# Patient Record
Sex: FEMALE | Race: BLACK OR AFRICAN AMERICAN | NOT HISPANIC OR LATINO | Employment: UNEMPLOYED | URBAN - NONMETROPOLITAN AREA
[De-identification: names, ages, dates, MRNs, and addresses within clinical notes are randomized per-mention and may not be internally consistent; named-entity substitution may affect disease eponyms.]

---

## 2020-01-22 ENCOUNTER — HOSPITAL ENCOUNTER (EMERGENCY)
Facility: HOSPITAL | Age: 55
Discharge: HOME | End: 2020-01-22
Attending: EMERGENCY MEDICINE | Admitting: EMERGENCY MEDICINE

## 2020-01-22 ENCOUNTER — APPOINTMENT (OUTPATIENT)
Dept: MRI IMAGING | Facility: HOSPITAL | Age: 55
End: 2020-01-22

## 2020-01-22 ENCOUNTER — APPOINTMENT (OUTPATIENT)
Dept: CT IMAGING | Facility: HOSPITAL | Age: 55
End: 2020-01-22

## 2020-01-22 ENCOUNTER — APPOINTMENT (OUTPATIENT)
Dept: GENERAL RADIOLOGY | Facility: HOSPITAL | Age: 55
End: 2020-01-22

## 2020-01-22 VITALS
BODY MASS INDEX: 38.65 KG/M2 | TEMPERATURE: 98.1 F | SYSTOLIC BLOOD PRESSURE: 130 MMHG | HEART RATE: 80 BPM | OXYGEN SATURATION: 96 % | HEIGHT: 65 IN | DIASTOLIC BLOOD PRESSURE: 76 MMHG | WEIGHT: 232 LBS | RESPIRATION RATE: 18 BRPM

## 2020-01-22 DIAGNOSIS — G40.909 SEIZURE DISORDER (HCC): ICD-10-CM

## 2020-01-22 DIAGNOSIS — R56.9 NONEPILEPTIC EPISODE (HCC): ICD-10-CM

## 2020-01-22 DIAGNOSIS — R41.82 ALTERED MENTAL STATUS, UNSPECIFIED ALTERED MENTAL STATUS TYPE: Primary | ICD-10-CM

## 2020-01-22 LAB
ABO GROUP BLD: NORMAL
ALBUMIN SERPL-MCNC: 4.4 G/DL (ref 3.5–5.2)
ALBUMIN/GLOB SERPL: 1.1 G/DL
ALP SERPL-CCNC: 102 U/L (ref 39–117)
ALT SERPL W P-5'-P-CCNC: 13 U/L (ref 1–33)
ANION GAP SERPL CALCULATED.3IONS-SCNC: 13 MMOL/L (ref 5–15)
APTT PPP: 25.8 SECONDS (ref 20–40.3)
AST SERPL-CCNC: 16 U/L (ref 1–32)
BASOPHILS # BLD AUTO: 0.06 10*3/MM3 (ref 0–0.2)
BASOPHILS NFR BLD AUTO: 0.6 % (ref 0–1.5)
BILIRUB SERPL-MCNC: 0.3 MG/DL (ref 0.2–1.2)
BLD GP AB SCN SERPL QL: NEGATIVE
BUN BLD-MCNC: 12 MG/DL (ref 6–20)
BUN/CREAT SERPL: 11.2 (ref 7–25)
CALCIUM SPEC-SCNC: 10.1 MG/DL (ref 8.6–10.5)
CHLORIDE SERPL-SCNC: 103 MMOL/L (ref 98–107)
CO2 SERPL-SCNC: 25 MMOL/L (ref 22–29)
CREAT BLD-MCNC: 1.07 MG/DL (ref 0.57–1)
DEPRECATED RDW RBC AUTO: 43.4 FL (ref 37–54)
EOSINOPHIL # BLD AUTO: 0.11 10*3/MM3 (ref 0–0.4)
EOSINOPHIL NFR BLD AUTO: 1.1 % (ref 0.3–6.2)
ERYTHROCYTE [DISTWIDTH] IN BLOOD BY AUTOMATED COUNT: 13.4 % (ref 12.3–15.4)
GFR SERPL CREATININE-BSD FRML MDRD: 53 ML/MIN/1.73
GFR SERPL CREATININE-BSD FRML MDRD: 65 ML/MIN/1.73
GLOBULIN UR ELPH-MCNC: 4.1 GM/DL
GLUCOSE BLD-MCNC: 93 MG/DL (ref 65–99)
HCT VFR BLD AUTO: 44 % (ref 34–46.6)
HGB BLD-MCNC: 14.3 G/DL (ref 12–15.9)
HOLD SPECIMEN: NORMAL
HOLD SPECIMEN: NORMAL
IMM GRANULOCYTES # BLD AUTO: 0.02 10*3/MM3 (ref 0–0.05)
IMM GRANULOCYTES NFR BLD AUTO: 0.2 % (ref 0–0.5)
INR PPP: 0.99 (ref 0.8–1.2)
LYMPHOCYTES # BLD AUTO: 3.88 10*3/MM3 (ref 0.7–3.1)
LYMPHOCYTES NFR BLD AUTO: 38 % (ref 19.6–45.3)
Lab: NORMAL
MCH RBC QN AUTO: 28.7 PG (ref 26.6–33)
MCHC RBC AUTO-ENTMCNC: 32.5 G/DL (ref 31.5–35.7)
MCV RBC AUTO: 88.4 FL (ref 79–97)
MONOCYTES # BLD AUTO: 0.66 10*3/MM3 (ref 0.1–0.9)
MONOCYTES NFR BLD AUTO: 6.5 % (ref 5–12)
NEUTROPHILS # BLD AUTO: 5.48 10*3/MM3 (ref 1.7–7)
NEUTROPHILS NFR BLD AUTO: 53.6 % (ref 42.7–76)
NRBC BLD AUTO-RTO: 0 /100 WBC (ref 0–0.2)
PLATELET # BLD AUTO: 335 10*3/MM3 (ref 140–450)
PMV BLD AUTO: 11.3 FL (ref 6–12)
POTASSIUM BLD-SCNC: 3.8 MMOL/L (ref 3.5–5.2)
PROT SERPL-MCNC: 8.5 G/DL (ref 6–8.5)
PROTHROMBIN TIME: 12.9 SECONDS (ref 11.1–15.3)
RBC # BLD AUTO: 4.98 10*6/MM3 (ref 3.77–5.28)
RH BLD: POSITIVE
SODIUM BLD-SCNC: 141 MMOL/L (ref 136–145)
T&S EXPIRATION DATE: NORMAL
TROPONIN T SERPL-MCNC: <0.01 NG/ML (ref 0–0.03)
WBC NRBC COR # BLD: 10.21 10*3/MM3 (ref 3.4–10.8)
WHOLE BLOOD HOLD SPECIMEN: NORMAL
WHOLE BLOOD HOLD SPECIMEN: NORMAL

## 2020-01-22 PROCEDURE — 86901 BLOOD TYPING SEROLOGIC RH(D): CPT | Performed by: EMERGENCY MEDICINE

## 2020-01-22 PROCEDURE — 70549 MR ANGIOGRAPH NECK W/O&W/DYE: CPT

## 2020-01-22 PROCEDURE — 85610 PROTHROMBIN TIME: CPT | Performed by: EMERGENCY MEDICINE

## 2020-01-22 PROCEDURE — 93010 ELECTROCARDIOGRAM REPORT: CPT | Performed by: INTERNAL MEDICINE

## 2020-01-22 PROCEDURE — 80053 COMPREHEN METABOLIC PANEL: CPT | Performed by: EMERGENCY MEDICINE

## 2020-01-22 PROCEDURE — 85730 THROMBOPLASTIN TIME PARTIAL: CPT | Performed by: EMERGENCY MEDICINE

## 2020-01-22 PROCEDURE — A9576 INJ PROHANCE MULTIPACK: HCPCS | Performed by: EMERGENCY MEDICINE

## 2020-01-22 PROCEDURE — 86900 BLOOD TYPING SEROLOGIC ABO: CPT

## 2020-01-22 PROCEDURE — 85025 COMPLETE CBC W/AUTO DIFF WBC: CPT | Performed by: EMERGENCY MEDICINE

## 2020-01-22 PROCEDURE — 70551 MRI BRAIN STEM W/O DYE: CPT

## 2020-01-22 PROCEDURE — 99285 EMERGENCY DEPT VISIT HI MDM: CPT

## 2020-01-22 PROCEDURE — 99245 OFF/OP CONSLTJ NEW/EST HI 55: CPT | Performed by: PSYCHIATRY & NEUROLOGY

## 2020-01-22 PROCEDURE — 70450 CT HEAD/BRAIN W/O DYE: CPT

## 2020-01-22 PROCEDURE — 86901 BLOOD TYPING SEROLOGIC RH(D): CPT

## 2020-01-22 PROCEDURE — 25010000002 GADOTERIDOL PER 1 ML: Performed by: EMERGENCY MEDICINE

## 2020-01-22 PROCEDURE — 86850 RBC ANTIBODY SCREEN: CPT | Performed by: EMERGENCY MEDICINE

## 2020-01-22 PROCEDURE — 71045 X-RAY EXAM CHEST 1 VIEW: CPT

## 2020-01-22 PROCEDURE — 70544 MR ANGIOGRAPHY HEAD W/O DYE: CPT

## 2020-01-22 PROCEDURE — 84484 ASSAY OF TROPONIN QUANT: CPT | Performed by: EMERGENCY MEDICINE

## 2020-01-22 PROCEDURE — 86900 BLOOD TYPING SEROLOGIC ABO: CPT | Performed by: EMERGENCY MEDICINE

## 2020-01-22 PROCEDURE — 93005 ELECTROCARDIOGRAM TRACING: CPT | Performed by: EMERGENCY MEDICINE

## 2020-01-22 RX ORDER — SODIUM CHLORIDE 0.9 % (FLUSH) 0.9 %
10 SYRINGE (ML) INJECTION AS NEEDED
Status: DISCONTINUED | OUTPATIENT
Start: 2020-01-22 | End: 2020-01-22 | Stop reason: HOSPADM

## 2020-01-22 RX ORDER — LEVETIRACETAM 500 MG/1
500 TABLET ORAL 2 TIMES DAILY
Qty: 60 TABLET | Refills: 0 | Status: SHIPPED | OUTPATIENT
Start: 2020-01-22 | End: 2022-03-08

## 2020-01-22 RX ADMIN — GADOTERIDOL 20 ML: 279.3 INJECTION, SOLUTION INTRAVENOUS at 16:18

## 2021-04-19 ENCOUNTER — IMMUNIZATION (OUTPATIENT)
Dept: VACCINE CLINIC | Facility: HOSPITAL | Age: 56
End: 2021-04-19

## 2021-04-19 PROCEDURE — 91300 HC SARSCOV02 VAC 30MCG/0.3ML IM: CPT | Performed by: THORACIC SURGERY (CARDIOTHORACIC VASCULAR SURGERY)

## 2021-04-19 PROCEDURE — 0001A: CPT | Performed by: THORACIC SURGERY (CARDIOTHORACIC VASCULAR SURGERY)

## 2021-05-10 ENCOUNTER — IMMUNIZATION (OUTPATIENT)
Dept: VACCINE CLINIC | Facility: HOSPITAL | Age: 56
End: 2021-05-10

## 2021-05-10 PROCEDURE — 0002A: CPT | Performed by: THORACIC SURGERY (CARDIOTHORACIC VASCULAR SURGERY)

## 2021-05-10 PROCEDURE — 91300 HC SARSCOV02 VAC 30MCG/0.3ML IM: CPT | Performed by: THORACIC SURGERY (CARDIOTHORACIC VASCULAR SURGERY)

## 2022-03-08 ENCOUNTER — APPOINTMENT (OUTPATIENT)
Dept: GENERAL RADIOLOGY | Facility: HOSPITAL | Age: 57
End: 2022-03-08

## 2022-03-08 ENCOUNTER — LAB (OUTPATIENT)
Dept: LAB | Facility: HOSPITAL | Age: 57
End: 2022-03-08

## 2022-03-08 ENCOUNTER — OFFICE VISIT (OUTPATIENT)
Dept: FAMILY MEDICINE CLINIC | Facility: CLINIC | Age: 57
End: 2022-03-08

## 2022-03-08 ENCOUNTER — HOSPITAL ENCOUNTER (EMERGENCY)
Facility: HOSPITAL | Age: 57
Discharge: HOME | End: 2022-03-08
Attending: STUDENT IN AN ORGANIZED HEALTH CARE EDUCATION/TRAINING PROGRAM

## 2022-03-08 VITALS
BODY MASS INDEX: 37.45 KG/M2 | OXYGEN SATURATION: 99 % | HEIGHT: 65 IN | TEMPERATURE: 97.2 F | WEIGHT: 224.8 LBS | SYSTOLIC BLOOD PRESSURE: 134 MMHG | DIASTOLIC BLOOD PRESSURE: 72 MMHG | HEART RATE: 100 BPM

## 2022-03-08 VITALS
RESPIRATION RATE: 20 BRPM | WEIGHT: 226.6 LBS | OXYGEN SATURATION: 100 % | SYSTOLIC BLOOD PRESSURE: 131 MMHG | HEIGHT: 64 IN | HEART RATE: 72 BPM | TEMPERATURE: 97.8 F | BODY MASS INDEX: 38.68 KG/M2 | DIASTOLIC BLOOD PRESSURE: 83 MMHG

## 2022-03-08 DIAGNOSIS — Z12.11 ENCOUNTER FOR SCREENING COLONOSCOPY: ICD-10-CM

## 2022-03-08 DIAGNOSIS — Z12.31 BREAST CANCER SCREENING BY MAMMOGRAM: ICD-10-CM

## 2022-03-08 DIAGNOSIS — M79.605 PAIN IN BOTH LOWER EXTREMITIES: Primary | ICD-10-CM

## 2022-03-08 DIAGNOSIS — R29.898 WEAKNESS OF BOTH LOWER EXTREMITIES: Primary | ICD-10-CM

## 2022-03-08 DIAGNOSIS — M79.604 PAIN IN BOTH LOWER EXTREMITIES: Primary | ICD-10-CM

## 2022-03-08 DIAGNOSIS — Z11.59 ENCOUNTER FOR HEPATITIS C SCREENING TEST FOR LOW RISK PATIENT: ICD-10-CM

## 2022-03-08 DIAGNOSIS — R42 LIGHTHEADED: ICD-10-CM

## 2022-03-08 DIAGNOSIS — Z00.00 WELLNESS EXAMINATION: ICD-10-CM

## 2022-03-08 DIAGNOSIS — R82.90 ABNORMAL URINE ODOR: ICD-10-CM

## 2022-03-08 LAB
ANION GAP SERPL CALCULATED.3IONS-SCNC: 8 MMOL/L (ref 5–15)
BACTERIA UR QL AUTO: ABNORMAL /HPF
BASOPHILS # BLD AUTO: 0.06 10*3/MM3 (ref 0–0.2)
BASOPHILS NFR BLD AUTO: 0.8 % (ref 0–1.5)
BILIRUB BLD-MCNC: ABNORMAL MG/DL
BILIRUB UR QL STRIP: NEGATIVE
BUN SERPL-MCNC: 13 MG/DL (ref 6–20)
BUN/CREAT SERPL: 13.4 (ref 7–25)
CALCIUM SPEC-SCNC: 9.1 MG/DL (ref 8.6–10.5)
CHLORIDE SERPL-SCNC: 106 MMOL/L (ref 98–107)
CLARITY UR: ABNORMAL
CLARITY, POC: CLEAR
CO2 SERPL-SCNC: 25 MMOL/L (ref 22–29)
COLOR UR: YELLOW
COLOR UR: YELLOW
CREAT SERPL-MCNC: 0.97 MG/DL (ref 0.57–1)
DEPRECATED RDW RBC AUTO: 43.3 FL (ref 37–54)
EGFRCR SERPLBLD CKD-EPI 2021: 68.3 ML/MIN/1.73
EOSINOPHIL # BLD AUTO: 0.11 10*3/MM3 (ref 0–0.4)
EOSINOPHIL NFR BLD AUTO: 1.5 % (ref 0.3–6.2)
ERYTHROCYTE [DISTWIDTH] IN BLOOD BY AUTOMATED COUNT: 13.4 % (ref 12.3–15.4)
GLUCOSE SERPL-MCNC: 101 MG/DL (ref 65–99)
GLUCOSE UR STRIP-MCNC: NEGATIVE MG/DL
GLUCOSE UR STRIP-MCNC: NEGATIVE MG/DL
HCT VFR BLD AUTO: 41 % (ref 34–46.6)
HGB BLD-MCNC: 13.7 G/DL (ref 12–15.9)
HGB UR QL STRIP.AUTO: NEGATIVE
HOLD SPECIMEN: NORMAL
HYALINE CASTS UR QL AUTO: ABNORMAL /LPF
IMM GRANULOCYTES # BLD AUTO: 0.02 10*3/MM3 (ref 0–0.05)
IMM GRANULOCYTES NFR BLD AUTO: 0.3 % (ref 0–0.5)
KETONES UR QL STRIP: NEGATIVE
KETONES UR QL: NEGATIVE
LEUKOCYTE ESTERASE UR QL STRIP.AUTO: ABNORMAL
LYMPHOCYTES # BLD AUTO: 2.16 10*3/MM3 (ref 0.7–3.1)
LYMPHOCYTES NFR BLD AUTO: 29.6 % (ref 19.6–45.3)
MAGNESIUM SERPL-MCNC: 2.3 MG/DL (ref 1.6–2.6)
MCH RBC QN AUTO: 29.5 PG (ref 26.6–33)
MCHC RBC AUTO-ENTMCNC: 33.4 G/DL (ref 31.5–35.7)
MCV RBC AUTO: 88.2 FL (ref 79–97)
MONOCYTES # BLD AUTO: 0.43 10*3/MM3 (ref 0.1–0.9)
MONOCYTES NFR BLD AUTO: 5.9 % (ref 5–12)
NEUTROPHILS NFR BLD AUTO: 4.52 10*3/MM3 (ref 1.7–7)
NEUTROPHILS NFR BLD AUTO: 61.9 % (ref 42.7–76)
NITRITE UR QL STRIP: NEGATIVE
NRBC BLD AUTO-RTO: 0 /100 WBC (ref 0–0.2)
NT-PROBNP SERPL-MCNC: 33.7 PG/ML (ref 0–900)
PH UR STRIP.AUTO: 6.5 [PH] (ref 5–9)
PH UR: 6 [PH] (ref 5–8)
PLATELET # BLD AUTO: 330 10*3/MM3 (ref 140–450)
PMV BLD AUTO: 11.4 FL (ref 6–12)
POTASSIUM SERPL-SCNC: 3.9 MMOL/L (ref 3.5–5.2)
PROT UR QL STRIP: NEGATIVE
PROT UR STRIP-MCNC: ABNORMAL MG/DL
QT INTERVAL: 396 MS
QTC INTERVAL: 424 MS
RBC # BLD AUTO: 4.65 10*6/MM3 (ref 3.77–5.28)
RBC # UR STRIP: ABNORMAL /HPF
RBC # UR STRIP: NEGATIVE /UL
REF LAB TEST METHOD: ABNORMAL
SODIUM SERPL-SCNC: 139 MMOL/L (ref 136–145)
SP GR UR STRIP: 1.02 (ref 1–1.03)
SP GR UR: 1.02 (ref 1–1.03)
SQUAMOUS #/AREA URNS HPF: ABNORMAL /HPF
TROPONIN T SERPL-MCNC: <0.01 NG/ML (ref 0–0.03)
UROBILINOGEN UR QL STRIP: ABNORMAL
WBC # UR STRIP: ABNORMAL /HPF
WBC NRBC COR # BLD: 7.3 10*3/MM3 (ref 3.4–10.8)

## 2022-03-08 PROCEDURE — 85025 COMPLETE CBC W/AUTO DIFF WBC: CPT | Performed by: STUDENT IN AN ORGANIZED HEALTH CARE EDUCATION/TRAINING PROGRAM

## 2022-03-08 PROCEDURE — 81001 URINALYSIS AUTO W/SCOPE: CPT | Performed by: STUDENT IN AN ORGANIZED HEALTH CARE EDUCATION/TRAINING PROGRAM

## 2022-03-08 PROCEDURE — 84443 ASSAY THYROID STIM HORMONE: CPT | Performed by: STUDENT IN AN ORGANIZED HEALTH CARE EDUCATION/TRAINING PROGRAM

## 2022-03-08 PROCEDURE — 93005 ELECTROCARDIOGRAM TRACING: CPT | Performed by: STUDENT IN AN ORGANIZED HEALTH CARE EDUCATION/TRAINING PROGRAM

## 2022-03-08 PROCEDURE — 83880 ASSAY OF NATRIURETIC PEPTIDE: CPT | Performed by: STUDENT IN AN ORGANIZED HEALTH CARE EDUCATION/TRAINING PROGRAM

## 2022-03-08 PROCEDURE — 80050 GENERAL HEALTH PANEL: CPT | Performed by: STUDENT IN AN ORGANIZED HEALTH CARE EDUCATION/TRAINING PROGRAM

## 2022-03-08 PROCEDURE — 82607 VITAMIN B-12: CPT | Performed by: STUDENT IN AN ORGANIZED HEALTH CARE EDUCATION/TRAINING PROGRAM

## 2022-03-08 PROCEDURE — 80053 COMPREHEN METABOLIC PANEL: CPT | Performed by: STUDENT IN AN ORGANIZED HEALTH CARE EDUCATION/TRAINING PROGRAM

## 2022-03-08 PROCEDURE — 71046 X-RAY EXAM CHEST 2 VIEWS: CPT

## 2022-03-08 PROCEDURE — 82746 ASSAY OF FOLIC ACID SERUM: CPT | Performed by: STUDENT IN AN ORGANIZED HEALTH CARE EDUCATION/TRAINING PROGRAM

## 2022-03-08 PROCEDURE — 80061 LIPID PANEL: CPT | Performed by: STUDENT IN AN ORGANIZED HEALTH CARE EDUCATION/TRAINING PROGRAM

## 2022-03-08 PROCEDURE — 93010 ELECTROCARDIOGRAM REPORT: CPT | Performed by: INTERNAL MEDICINE

## 2022-03-08 PROCEDURE — 81002 URINALYSIS NONAUTO W/O SCOPE: CPT | Performed by: STUDENT IN AN ORGANIZED HEALTH CARE EDUCATION/TRAINING PROGRAM

## 2022-03-08 PROCEDURE — 84484 ASSAY OF TROPONIN QUANT: CPT | Performed by: STUDENT IN AN ORGANIZED HEALTH CARE EDUCATION/TRAINING PROGRAM

## 2022-03-08 PROCEDURE — 99203 OFFICE O/P NEW LOW 30 MIN: CPT | Performed by: STUDENT IN AN ORGANIZED HEALTH CARE EDUCATION/TRAINING PROGRAM

## 2022-03-08 PROCEDURE — 83036 HEMOGLOBIN GLYCOSYLATED A1C: CPT | Performed by: STUDENT IN AN ORGANIZED HEALTH CARE EDUCATION/TRAINING PROGRAM

## 2022-03-08 PROCEDURE — 99283 EMERGENCY DEPT VISIT LOW MDM: CPT

## 2022-03-08 PROCEDURE — 83735 ASSAY OF MAGNESIUM: CPT | Performed by: STUDENT IN AN ORGANIZED HEALTH CARE EDUCATION/TRAINING PROGRAM

## 2022-03-08 PROCEDURE — 86803 HEPATITIS C AB TEST: CPT | Performed by: STUDENT IN AN ORGANIZED HEALTH CARE EDUCATION/TRAINING PROGRAM

## 2022-03-08 PROCEDURE — 36415 COLL VENOUS BLD VENIPUNCTURE: CPT | Performed by: STUDENT IN AN ORGANIZED HEALTH CARE EDUCATION/TRAINING PROGRAM

## 2022-03-08 PROCEDURE — 82550 ASSAY OF CK (CPK): CPT | Performed by: STUDENT IN AN ORGANIZED HEALTH CARE EDUCATION/TRAINING PROGRAM

## 2022-03-08 RX ORDER — DULOXETIN HYDROCHLORIDE 60 MG/1
60 CAPSULE, DELAYED RELEASE ORAL DAILY
Qty: 30 CAPSULE | Refills: 1 | Status: SHIPPED | OUTPATIENT
Start: 2022-03-08 | End: 2023-01-23

## 2022-03-08 RX ADMIN — SODIUM CHLORIDE, POTASSIUM CHLORIDE, SODIUM LACTATE AND CALCIUM CHLORIDE 1000 ML: 600; 310; 30; 20 INJECTION, SOLUTION INTRAVENOUS at 10:03

## 2022-03-08 NOTE — ED PROVIDER NOTES
Subjective   56-year-old female comes to the ER with many concerns.  Mainly she is had several episodes of falling recently due to weakness in her legs.  Patient also endorses getting lightheaded at times.  No blacking out.  No focal weakness or numbness.  She reports last time that she fell her right side of her middle back is hurting.  No loss of consciousness. This started 2-3 weeks ago.      History provided by:  Patient   used: No        Review of Systems   Constitutional: Positive for activity change and fatigue. Negative for appetite change, chills and fever.   HENT: Negative for congestion and rhinorrhea.    Respiratory: Negative for cough and shortness of breath.    Cardiovascular: Negative for chest pain and palpitations.   Gastrointestinal: Negative for abdominal pain and nausea.   Genitourinary: Negative for dysuria and flank pain.   Musculoskeletal: Positive for back pain. Negative for neck pain.   Skin: Negative for color change and rash.   Neurological: Positive for light-headedness. Negative for dizziness, weakness, numbness and headaches.   Psychiatric/Behavioral: Negative for agitation. The patient is not nervous/anxious.        Past Medical History:   Diagnosis Date   • Stroke (HCC)        Allergies   Allergen Reactions   • Codeine Unknown - High Severity   • Contrast Dye Unknown - High Severity       Past Surgical History:   Procedure Laterality Date   • ABDOMINAL SURGERY     • APPENDECTOMY     • TUBAL ABDOMINAL LIGATION         History reviewed. No pertinent family history.    Social History     Socioeconomic History   • Marital status:    Tobacco Use   • Smoking status: Never Smoker   • Smokeless tobacco: Never Used   Substance and Sexual Activity   • Alcohol use: Never           Objective    Vitals:    03/08/22 0932   BP: 156/88   BP Location: Right arm   Patient Position: Sitting   Pulse: 87   Resp: 20   Temp: 97.8 °F (36.6 °C)   TempSrc: Infrared   SpO2: 96%  "  Weight: 103 kg (226 lb 9.6 oz)   Height: 162.6 cm (64\")       Physical Exam  Vitals and nursing note reviewed.   Constitutional:       General: She is not in acute distress.     Appearance: She is well-developed. She is obese. She is not ill-appearing, toxic-appearing or diaphoretic.   HENT:      Head: Normocephalic.      Right Ear: External ear normal.      Left Ear: External ear normal.   Eyes:      Pupils: Pupils are equal, round, and reactive to light.   Pulmonary:      Effort: Pulmonary effort is normal. No accessory muscle usage or respiratory distress.      Breath sounds: No wheezing.   Chest:      Chest wall: No tenderness.   Abdominal:      General: Bowel sounds are normal.      Palpations: Abdomen is soft.      Tenderness: There is no abdominal tenderness (deep palpation).   Musculoskeletal:         General: No tenderness, deformity or signs of injury. Normal range of motion.        Back:       Right lower leg: No edema.      Left lower leg: No edema.   Skin:     General: Skin is warm and dry.   Neurological:      Mental Status: She is alert and oriented to person, place, and time.      Sensory: No sensory deficit.      Motor: No weakness.      Coordination: Coordination normal.   Psychiatric:         Behavior: Behavior normal.         ECG 12 Lead      Date/Time: 3/8/2022 11:06 AM  Performed by: Tesfaye Chowdhury MD  Authorized by: Tesfaye Chowdhury MD   Interpreted by physician  Rhythm: sinus rhythm  Rate: normal  QRS axis: normal  ST segment elevation noted on lead: none.                   ED Course      Results for orders placed or performed during the hospital encounter of 03/08/22   Basic Metabolic Panel    Specimen: Blood   Result Value Ref Range    Glucose 101 (H) 65 - 99 mg/dL    BUN 13 6 - 20 mg/dL    Creatinine 0.97 0.57 - 1.00 mg/dL    Sodium 139 136 - 145 mmol/L    Potassium 3.9 3.5 - 5.2 mmol/L    Chloride 106 98 - 107 mmol/L    CO2 25.0 22.0 - 29.0 mmol/L    Calcium 9.1 8.6 - 10.5 mg/dL    " BUN/Creatinine Ratio 13.4 7.0 - 25.0    Anion Gap 8.0 5.0 - 15.0 mmol/L    eGFR 68.3 >60.0 mL/min/1.73   Urinalysis With Microscopic If Indicated (No Culture) - Urine, Clean Catch    Specimen: Urine, Clean Catch   Result Value Ref Range    Color, UA Yellow Yellow, Straw, Dark Yellow, Carli    Appearance, UA Cloudy (A) Clear    pH, UA 6.5 5.0 - 9.0    Specific Gravity, UA 1.020 1.003 - 1.030    Glucose, UA Negative Negative    Ketones, UA Negative Negative    Bilirubin, UA Negative Negative    Blood, UA Negative Negative    Protein, UA Negative Negative    Leuk Esterase, UA Small (1+) (A) Negative    Nitrite, UA Negative Negative    Urobilinogen, UA 1.0 E.U./dL 0.2 - 1.0 E.U./dL   Magnesium    Specimen: Blood   Result Value Ref Range    Magnesium 2.3 1.6 - 2.6 mg/dL   Troponin    Specimen: Blood   Result Value Ref Range    Troponin T <0.010 0.000 - 0.030 ng/mL   BNP    Specimen: Blood   Result Value Ref Range    proBNP 33.7 0.0 - 900.0 pg/mL   CBC Auto Differential    Specimen: Blood   Result Value Ref Range    WBC 7.30 3.40 - 10.80 10*3/mm3    RBC 4.65 3.77 - 5.28 10*6/mm3    Hemoglobin 13.7 12.0 - 15.9 g/dL    Hematocrit 41.0 34.0 - 46.6 %    MCV 88.2 79.0 - 97.0 fL    MCH 29.5 26.6 - 33.0 pg    MCHC 33.4 31.5 - 35.7 g/dL    RDW 13.4 12.3 - 15.4 %    RDW-SD 43.3 37.0 - 54.0 fl    MPV 11.4 6.0 - 12.0 fL    Platelets 330 140 - 450 10*3/mm3    Neutrophil % 61.9 42.7 - 76.0 %    Lymphocyte % 29.6 19.6 - 45.3 %    Monocyte % 5.9 5.0 - 12.0 %    Eosinophil % 1.5 0.3 - 6.2 %    Basophil % 0.8 0.0 - 1.5 %    Immature Grans % 0.3 0.0 - 0.5 %    Neutrophils, Absolute 4.52 1.70 - 7.00 10*3/mm3    Lymphocytes, Absolute 2.16 0.70 - 3.10 10*3/mm3    Monocytes, Absolute 0.43 0.10 - 0.90 10*3/mm3    Eosinophils, Absolute 0.11 0.00 - 0.40 10*3/mm3    Basophils, Absolute 0.06 0.00 - 0.20 10*3/mm3    Immature Grans, Absolute 0.02 0.00 - 0.05 10*3/mm3    nRBC 0.0 0.0 - 0.2 /100 WBC   Urinalysis, Microscopic Only - Urine, Clean Catch     Specimen: Urine, Clean Catch   Result Value Ref Range    RBC, UA 0-2 (A) None Seen /HPF    WBC, UA 6-12 (A) None Seen, 0-2, 3-5 /HPF    Bacteria, UA 2+ (A) None Seen /HPF    Squamous Epithelial Cells, UA 6-12 (A) None Seen, 0-2 /HPF    Hyaline Casts, UA 0-2 None Seen /LPF    Methodology Automated Microscopy      XR Chest 2 View   Final Result   No acute cardiopulmonary disease      Electronically signed by:  Derick Nelson MD  3/8/2022 10:43 AM CST   Workstation: [id number]                                                   MDM  Number of Diagnoses or Management Options  Lightheaded: new and requires workup  Weakness of both lower extremities: new and requires workup  Diagnosis management comments: Vital signs are stable, afebrile.  Neurologically intact.  Labs are unremarkable.  Chest x-ray shows no acute cardiopulmonary process.  No obvious acute osseous pathology after discussion with radiologist.  Patient has an appointment with her PCP later today.  Recommend she keep that appointment.  Return precautions provided.      Final diagnoses:   Weakness of both lower extremities   Lightheaded       ED Disposition  ED Disposition     ED Disposition   Discharge    Condition   Stable    Comment   --             Tess Bermudez MD  [address] [place], [place] [id number] [phone]    Go today  ER follow up         Medication List      No changes were made to your prescriptions during this visit.               Tesfaye Chowdhury MD  03/08/22 0849

## 2022-03-08 NOTE — PROGRESS NOTES
Family Medicine Residency  Tess Bermudez MD    Subjective:     Sloan Boyd is a 56 y.o. female who presents for weakness of her legs bilaterally. She states that she has had a three week history of bilateral leg weakness, numbness, sharp burning pain. She states that she has been recently falling and has fell three times since Friday. She states that she falls suddenly and doesn't know when she is about to fall. She states that she does not lose consciousness during these falls and does not lose control of her bowels. She states that she previously has had these episodes for past 8 months but it comes off an on.Otherwise she states that she has a history of shoulder pain and back pain which is secondary to the falls. She states that she has had a history of a stroke in 2010 and had some weakness on her left side which she mended with doing physical therapy herself. She states that she has not had any follow up care. She states that she is only taking turmeric and Kanna root and doesn't take any other medications.     She states that she regularly used to check her sugar with a machine but has not been diagnosed with diabetes in the past. She states that her vision has decreased over the past year and she needs reading glasses but has not formally had her eyes evaluated. She is concerned that she should not be able to drive or work due to the falls that occur unexpectedly.     The following portions of the patient's history were reviewed and updated as appropriate: allergies, current medications, past family history, past medical history, past social history, past surgical history and problem list.    Past Medical Hx:  Past Medical History:   Diagnosis Date   • Stroke (HCC)        Past Surgical Hx:  Past Surgical History:   Procedure Laterality Date   • ABDOMINAL SURGERY     • APPENDECTOMY     • TUBAL ABDOMINAL LIGATION         Current Meds:    Current Outpatient Medications:   •  DULoxetine (Cymbalta) 60 MG  "capsule, Take 1 capsule by mouth Daily., Disp: 30 capsule, Rfl: 1  No current facility-administered medications for this visit.    Allergies:  Allergies   Allergen Reactions   • Codeine Unknown - High Severity   • Contrast Dye Unknown - High Severity       Family Hx:  History reviewed. No pertinent family history.     Social History:  Social History     Socioeconomic History   • Marital status:    Tobacco Use   • Smoking status: Never Smoker   • Smokeless tobacco: Never Used   Vaping Use   • Vaping Use: Never used   Substance and Sexual Activity   • Alcohol use: Never       Review of Systems  Review of Systems   Constitutional: Negative for fatigue.   Respiratory: Negative for shortness of breath.    Cardiovascular: Negative for chest pain and leg swelling.   Gastrointestinal: Negative for abdominal pain, constipation, diarrhea and nausea.   Endocrine: Negative for cold intolerance.   Genitourinary: Negative for menstrual problem.   Skin: Negative for rash.   Neurological: Negative for weakness, light-headedness and numbness.       Objective:     /72   Pulse 100   Temp 97.2 °F (36.2 °C)   Ht 165.1 cm (65\")   Wt 102 kg (224 lb 12.8 oz)   SpO2 99%   BMI 37.41 kg/m²   Physical Exam  Vitals reviewed.   Constitutional:       Appearance: Normal appearance.   HENT:      Head: Normocephalic and atraumatic.      Right Ear: Tympanic membrane normal.      Left Ear: Tympanic membrane normal.      Nose: Nose normal.      Mouth/Throat:      Mouth: Mucous membranes are moist.   Eyes:      Pupils: Pupils are equal, round, and reactive to light.   Cardiovascular:      Rate and Rhythm: Normal rate.      Pulses: Normal pulses.      Heart sounds: Normal heart sounds.   Pulmonary:      Effort: Pulmonary effort is normal.      Breath sounds: Normal breath sounds.   Abdominal:      General: Abdomen is flat. Bowel sounds are normal.      Palpations: Abdomen is soft.   Musculoskeletal:         General: Normal range of " motion.      Cervical back: Normal range of motion and neck supple.   Skin:     General: Skin is warm and dry.      Capillary Refill: Capillary refill takes less than 2 seconds.   Neurological:      General: No focal deficit present.      Mental Status: She is alert and oriented to person, place, and time. Mental status is at baseline.   Psychiatric:         Mood and Affect: Mood normal.          Assessment/Plan:     Diagnoses and all orders for this visit:    1. Wellness examination (Primary)  -     Lipid Panel  -     Hemoglobin A1c    2. Pain in both lower extremities  -  Will rule out stroke/MS with MRI BRAIN without contrast.   - Cymbalta for Neuropathic pain of bl legs.   - Will rule out electrolyte and vitamin B12/folate deficiency.           -     CMP  -     TSH  -     CK  -     Vitamin B12  -     Folate  -     DULoxetine (Cymbalta) 60 MG capsule; Take 1 capsule by mouth Daily.  Dispense: 30 capsule; Refill: 1  -     MRI Brain Without Contrast    3. Encounter for screening colonoscopy  -     Ambulatory Referral For Screening Colonoscopy    4. Breast cancer screening by mammogram  -     Mammo screening digital tomosynthesis bilateral w CAD    5. Encounter for hepatitis C screening test for low risk patient  -     Hepatitis C antibody        · Rx changes: As above  · Patient Education: n/a  · Compliance at present is estimated to be fair.   · Efforts to improve compliance (if necessary) will be directed at increased exercise.    Depression screening: Depression screening performed today; result negative; no follow up needed     Follow-up:     Return in about 2 weeks (around 3/22/2022).    Preventative:  Health Maintenance   Topic Date Due   • MAMMOGRAM  Never done   • COLORECTAL CANCER SCREENING  Never done   • ANNUAL PHYSICAL  Never done   • TDAP/TD VACCINES (1 - Tdap) Never done   • ZOSTER VACCINE (1 of 2) Never done   • INFLUENZA VACCINE  Never done   • COVID-19 Vaccine (3 - Booster for Pfizer series)  10/10/2021   • HEPATITIS C SCREENING  Never done   • PAP SMEAR  Never done   • Pneumococcal Vaccine 0-64  Aged Out     Female Preventative: Exercises regularly  Recommended: none  Vaccine Counseling: N/A    Weight  -Class: Obese Class II: 35-39.9kg/m2  -Patient's Body mass index is 37.41 kg/m². indicating that she is morbidly obese (BMI > 40 or > 35 with obesity - related health condition). Obesity-related health conditions include the following: none. Obesity is newly identified. BMI is is above average; BMI management plan is completed. We discussed portion control and increasing exercise..   eat more fruits and vegetables    Alcohol use:  reports no history of alcohol use.  Nicotine status  reports that she has never smoked. She has never used smokeless tobacco.     Goals    None         RISK SCORE: 3        This document has been electronically signed by Tess Bermudez MD on March 8, 2022 15:07 CST

## 2022-03-08 NOTE — PROGRESS NOTES
I have seen the patient.  I have reviewed the notes, assessments, and/or procedures performed by Tess Bermudez MD, I concur with her/his documentation and assessment and plan for Sloan Boyd.               This document has been electronically signed by Freedom Short MD on March 8, 2022 16:51 CST

## 2022-03-08 NOTE — ED NOTES
Pt alert and oriented with multiple added complaints. Dizziness with feeling lightheaded and several falls for the past several weeks and for years she has been having muscle spasms. She also reports she had a bladder infection for which she took a natural abx for. No fever. She is alert and oriented and moving all her extremities. She denies any shortness of air or chest pain.      Mini Huddleston, CHAZ  03/08/22 0940

## 2022-03-09 LAB
ALBUMIN SERPL-MCNC: 4.3 G/DL (ref 3.5–5.2)
ALBUMIN/GLOB SERPL: 1.2 G/DL
ALP SERPL-CCNC: 93 U/L (ref 39–117)
ALT SERPL W P-5'-P-CCNC: 12 U/L (ref 1–33)
ANION GAP SERPL CALCULATED.3IONS-SCNC: 10 MMOL/L (ref 5–15)
AST SERPL-CCNC: 17 U/L (ref 1–32)
BILIRUB SERPL-MCNC: 0.3 MG/DL (ref 0–1.2)
BUN SERPL-MCNC: 13 MG/DL (ref 6–20)
BUN/CREAT SERPL: 16 (ref 7–25)
CALCIUM SPEC-SCNC: 9.5 MG/DL (ref 8.6–10.5)
CHLORIDE SERPL-SCNC: 105 MMOL/L (ref 98–107)
CHOLEST SERPL-MCNC: 236 MG/DL (ref 0–200)
CK SERPL-CCNC: 73 U/L (ref 20–180)
CO2 SERPL-SCNC: 28 MMOL/L (ref 22–29)
CREAT SERPL-MCNC: 0.81 MG/DL (ref 0.57–1)
EGFRCR SERPLBLD CKD-EPI 2021: 84.8 ML/MIN/1.73
FOLATE SERPL-MCNC: 11 NG/ML (ref 4.78–24.2)
GLOBULIN UR ELPH-MCNC: 3.6 GM/DL
GLUCOSE SERPL-MCNC: 79 MG/DL (ref 65–99)
HBA1C MFR BLD: 5.4 % (ref 4.8–5.6)
HCV AB SER DONR QL: NORMAL
HDLC SERPL-MCNC: 53 MG/DL (ref 40–60)
LDLC SERPL CALC-MCNC: 160 MG/DL (ref 0–100)
LDLC/HDLC SERPL: 2.98 {RATIO}
POTASSIUM SERPL-SCNC: 4.4 MMOL/L (ref 3.5–5.2)
PROT SERPL-MCNC: 7.9 G/DL (ref 6–8.5)
SODIUM SERPL-SCNC: 143 MMOL/L (ref 136–145)
TRIGL SERPL-MCNC: 126 MG/DL (ref 0–150)
TSH SERPL DL<=0.05 MIU/L-ACNC: 1.74 UIU/ML (ref 0.27–4.2)
VIT B12 BLD-MCNC: 776 PG/ML (ref 211–946)
VLDLC SERPL-MCNC: 23 MG/DL (ref 5–40)

## 2022-03-10 RX ORDER — ATORVASTATIN CALCIUM 40 MG/1
40 TABLET, FILM COATED ORAL DAILY
Qty: 30 TABLET | Refills: 3 | Status: SHIPPED | OUTPATIENT
Start: 2022-03-10 | End: 2022-05-18

## 2022-03-11 ENCOUNTER — TELEPHONE (OUTPATIENT)
Dept: FAMILY MEDICINE CLINIC | Facility: CLINIC | Age: 57
End: 2022-03-11

## 2022-03-14 ENCOUNTER — TELEPHONE (OUTPATIENT)
Dept: FAMILY MEDICINE CLINIC | Facility: CLINIC | Age: 57
End: 2022-03-14

## 2022-03-14 NOTE — TELEPHONE ENCOUNTER
attempted to call pt in reference to referral appt date & time. got recording mailbox is full. unable to leave message

## 2022-03-22 ENCOUNTER — OFFICE VISIT (OUTPATIENT)
Dept: FAMILY MEDICINE CLINIC | Facility: CLINIC | Age: 57
End: 2022-03-22

## 2022-03-22 VITALS
WEIGHT: 226.2 LBS | TEMPERATURE: 96.3 F | HEIGHT: 65 IN | HEART RATE: 84 BPM | DIASTOLIC BLOOD PRESSURE: 70 MMHG | SYSTOLIC BLOOD PRESSURE: 120 MMHG | OXYGEN SATURATION: 99 % | BODY MASS INDEX: 37.69 KG/M2

## 2022-03-22 DIAGNOSIS — M79.605 PAIN IN BOTH LOWER EXTREMITIES: ICD-10-CM

## 2022-03-22 DIAGNOSIS — E78.00 HYPERCHOLESTEREMIA: Primary | ICD-10-CM

## 2022-03-22 DIAGNOSIS — M79.604 PAIN IN BOTH LOWER EXTREMITIES: ICD-10-CM

## 2022-03-22 DIAGNOSIS — F41.9 ANXIETY: ICD-10-CM

## 2022-03-22 PROCEDURE — 99213 OFFICE O/P EST LOW 20 MIN: CPT | Performed by: STUDENT IN AN ORGANIZED HEALTH CARE EDUCATION/TRAINING PROGRAM

## 2022-03-22 NOTE — PROGRESS NOTES
Family Medicine Residency  Tess Bermudez MD    Subjective:     Sloan Boyd is a 56 y.o. female who presents for follow-up for pain in bilateral lower extremities.  She states that she took Cymbalta for about 1 week and her pain has completely resolved.  She states that she is now currently pain-free and is able to go back to work.  She states that since she stopped taking Cymbalta for the past week she still does not have any pain.  She states that she does not have any further lower extremity weakness and is not having any further fall episodes.  He states that she is working with her family member who is his physical therapist to regain her stamina and walk 3 miles that she she used to previously.  She states that she has picked up her Lipitor however she would not like to take it at this time because she would like to try lifestyle modifications first.     The following portions of the patient's history were reviewed and updated as appropriate: allergies, current medications, past family history, past medical history, past social history, past surgical history and problem list.    Past Medical Hx:  Past Medical History:   Diagnosis Date   • Stroke (HCC)        Past Surgical Hx:  Past Surgical History:   Procedure Laterality Date   • ABDOMINAL SURGERY     • APPENDECTOMY     • TUBAL ABDOMINAL LIGATION         Current Meds:    Current Outpatient Medications:   •  atorvastatin (Lipitor) 40 MG tablet, Take 1 tablet by mouth Daily., Disp: 30 tablet, Rfl: 3  •  DULoxetine (Cymbalta) 60 MG capsule, Take 1 capsule by mouth Daily., Disp: 30 capsule, Rfl: 1    Allergies:  Allergies   Allergen Reactions   • Codeine Unknown - High Severity   • Contrast Dye Unknown - High Severity       Family Hx:  History reviewed. No pertinent family history.     Social History:  Social History     Socioeconomic History   • Marital status:    Tobacco Use   • Smoking status: Never Smoker   • Smokeless tobacco: Never Used   Vaping  "Use   • Vaping Use: Never used   Substance and Sexual Activity   • Alcohol use: Never       Review of Systems  Review of Systems   Constitutional: Negative for fatigue.   Respiratory: Negative for shortness of breath.    Cardiovascular: Negative for chest pain and leg swelling.   Gastrointestinal: Negative for abdominal pain, constipation, diarrhea and nausea.   Endocrine: Negative for cold intolerance.   Genitourinary: Negative for menstrual problem.   Skin: Negative for rash.   Neurological: Negative for weakness, light-headedness and numbness.       Objective:     /70   Pulse 84   Temp 96.3 °F (35.7 °C)   Ht 165.1 cm (65\")   Wt 103 kg (226 lb 3.2 oz)   SpO2 99%   BMI 37.64 kg/m²   Physical Exam  Vitals reviewed.   Constitutional:       Appearance: Normal appearance. She is obese.   HENT:      Head: Normocephalic and atraumatic.      Right Ear: Tympanic membrane normal.      Left Ear: Tympanic membrane normal.      Nose: Nose normal.      Mouth/Throat:      Mouth: Mucous membranes are moist.   Eyes:      Pupils: Pupils are equal, round, and reactive to light.   Cardiovascular:      Rate and Rhythm: Normal rate.      Pulses: Normal pulses.      Heart sounds: Normal heart sounds.   Pulmonary:      Effort: Pulmonary effort is normal.      Breath sounds: Normal breath sounds.   Abdominal:      General: Abdomen is flat. Bowel sounds are normal.      Palpations: Abdomen is soft.   Musculoskeletal:         General: Normal range of motion.      Cervical back: Normal range of motion and neck supple.   Skin:     General: Skin is warm and dry.      Capillary Refill: Capillary refill takes less than 2 seconds.   Neurological:      General: No focal deficit present.      Mental Status: She is alert and oriented to person, place, and time. Mental status is at baseline.   Psychiatric:         Mood and Affect: Mood normal.          Assessment/Plan:     Diagnoses and all orders for this visit:    1. Hypercholesteremia " (Primary)  -We have prescribed the patient atorvastatin.  We have explained to the patient that not taking atorvastatin may increase her risk of heart attack and stroke given her previous history and current cholesterol levels.  The patient would like to try lifestyle modifications at this time.  We have advised the patient to get lab work done 1 week before her next visit in 2 months while fasting.  If at the next visit she is unable to improve her cholesterol we will ask her to restart taking the atorvastatin.  -     Lipid panel; Future    2. Pain in both lower extremities  -Patient's pain in bilateral extremities have completely resolved.  We have advised the patient that if she continues to have pain to restart taking the Cymbalta.  We have also advised the patient that if she is having any further pain, weakness or falls to contact our office or go to the emergency department.      3. Anxiety.   The patient states that she is no longer having any further anxiety.  She states that most of her anxiety was due to her pain at the last visit and that has now completely resolved.  · Rx changes: n/a  · Patient Education: n/a  · Compliance at present is estimated to be good.   · Efforts to improve compliance (if necessary) will be directed at increased exercise.    Depression screening: Up to date; last screen      Follow-up:     Return in about 2 months (around 5/22/2022).    Preventative:  Health Maintenance   Topic Date Due   • MAMMOGRAM  Never done   • COLORECTAL CANCER SCREENING  Never done   • ANNUAL PHYSICAL  Never done   • TDAP/TD VACCINES (1 - Tdap) Never done   • ZOSTER VACCINE (1 of 2) Never done   • INFLUENZA VACCINE  Never done   • COVID-19 Vaccine (3 - Booster for Pfizer series) 10/10/2021   • PAP SMEAR  Never done   • LIPID PANEL  03/08/2023   • HEPATITIS C SCREENING  Completed   • Pneumococcal Vaccine 0-64  Aged Out     Female Preventative: Exercises regularly  Recommended: none  Vaccine Counseling:  N/A    Weight  -Class: Obese Class II: 35-39.9kg/m2  -Patient's Body mass index is 37.64 kg/m². indicating that she is morbidly obese (BMI > 40 or > 35 with obesity - related health condition). Obesity-related health conditions include the following: none. Obesity is improving with lifestyle modifications. BMI is is above average; BMI management plan is completed. We discussed portion control and increasing exercise..   eat more fruits and vegetables    Alcohol use:  reports no history of alcohol use.  Nicotine status  reports that she has never smoked. She has never used smokeless tobacco.     Goals    None         RISK SCORE: 3        This document has been electronically signed by Tess Bermudez MD on March 22, 2022 09:23 CDT

## 2022-03-22 NOTE — PROGRESS NOTES
I have spoken with the patient .     I have reviewed the notes, assessments, and/or procedures performed by Dr. Tess Bermudez,   I concur with   her  documentation and assessment and plan for Sloan Boyd.          This document has been electronically signed by Mark Calvillo MD on March 22, 2022 09:59 CDT

## 2022-03-23 ENCOUNTER — TELEPHONE (OUTPATIENT)
Dept: FAMILY MEDICINE CLINIC | Facility: CLINIC | Age: 57
End: 2022-03-23

## 2022-03-23 NOTE — TELEPHONE ENCOUNTER
Attempted to call patient to let her know I cancelled her MRI appointment on 4/4. The voicemail was full but will try again later on today.

## 2022-04-04 ENCOUNTER — APPOINTMENT (OUTPATIENT)
Dept: MRI IMAGING | Facility: HOSPITAL | Age: 57
End: 2022-04-04

## 2022-05-10 ENCOUNTER — LAB (OUTPATIENT)
Dept: LAB | Facility: HOSPITAL | Age: 57
End: 2022-05-10

## 2022-05-10 DIAGNOSIS — E78.00 HYPERCHOLESTEREMIA: ICD-10-CM

## 2022-05-10 LAB
CHOLEST SERPL-MCNC: 215 MG/DL (ref 0–200)
HDLC SERPL-MCNC: 48 MG/DL (ref 40–60)
LDLC SERPL CALC-MCNC: 150 MG/DL (ref 0–100)
LDLC/HDLC SERPL: 3.08 {RATIO}
TRIGL SERPL-MCNC: 95 MG/DL (ref 0–150)
VLDLC SERPL-MCNC: 17 MG/DL (ref 5–40)

## 2022-05-10 PROCEDURE — 36415 COLL VENOUS BLD VENIPUNCTURE: CPT

## 2022-05-10 PROCEDURE — 80061 LIPID PANEL: CPT

## 2022-05-11 NOTE — PROGRESS NOTES
The labs results have been reviewed. The patient has been called and informed of the lab results. All questions and concerns have been answered during this phone call.

## 2022-05-18 ENCOUNTER — LAB (OUTPATIENT)
Dept: LAB | Facility: HOSPITAL | Age: 57
End: 2022-05-18

## 2022-05-18 ENCOUNTER — OFFICE VISIT (OUTPATIENT)
Dept: FAMILY MEDICINE CLINIC | Facility: CLINIC | Age: 57
End: 2022-05-18

## 2022-05-18 VITALS
BODY MASS INDEX: 37.85 KG/M2 | DIASTOLIC BLOOD PRESSURE: 76 MMHG | HEART RATE: 90 BPM | OXYGEN SATURATION: 97 % | WEIGHT: 227.2 LBS | HEIGHT: 65 IN | SYSTOLIC BLOOD PRESSURE: 126 MMHG | TEMPERATURE: 90.6 F

## 2022-05-18 DIAGNOSIS — R41.3 MEMORY LOSS: Primary | ICD-10-CM

## 2022-05-18 LAB
BASOPHILS # BLD AUTO: 0.06 10*3/MM3 (ref 0–0.2)
BASOPHILS NFR BLD AUTO: 0.9 % (ref 0–1.5)
DEPRECATED RDW RBC AUTO: 39.8 FL (ref 37–54)
EOSINOPHIL # BLD AUTO: 0.15 10*3/MM3 (ref 0–0.4)
EOSINOPHIL NFR BLD AUTO: 2.2 % (ref 0.3–6.2)
ERYTHROCYTE [DISTWIDTH] IN BLOOD BY AUTOMATED COUNT: 12.8 % (ref 12.3–15.4)
HCT VFR BLD AUTO: 40 % (ref 34–46.6)
HGB BLD-MCNC: 13.3 G/DL (ref 12–15.9)
IMM GRANULOCYTES # BLD AUTO: 0.02 10*3/MM3 (ref 0–0.05)
IMM GRANULOCYTES NFR BLD AUTO: 0.3 % (ref 0–0.5)
LYMPHOCYTES # BLD AUTO: 2.55 10*3/MM3 (ref 0.7–3.1)
LYMPHOCYTES NFR BLD AUTO: 37.7 % (ref 19.6–45.3)
MCH RBC QN AUTO: 29.2 PG (ref 26.6–33)
MCHC RBC AUTO-ENTMCNC: 33.3 G/DL (ref 31.5–35.7)
MCV RBC AUTO: 87.7 FL (ref 79–97)
MONOCYTES # BLD AUTO: 0.42 10*3/MM3 (ref 0.1–0.9)
MONOCYTES NFR BLD AUTO: 6.2 % (ref 5–12)
NEUTROPHILS NFR BLD AUTO: 3.57 10*3/MM3 (ref 1.7–7)
NEUTROPHILS NFR BLD AUTO: 52.7 % (ref 42.7–76)
NRBC BLD AUTO-RTO: 0 /100 WBC (ref 0–0.2)
PLATELET # BLD AUTO: 303 10*3/MM3 (ref 140–450)
PMV BLD AUTO: 12.7 FL (ref 6–12)
RBC # BLD AUTO: 4.56 10*6/MM3 (ref 3.77–5.28)
TSH SERPL DL<=0.05 MIU/L-ACNC: 1.24 UIU/ML (ref 0.27–4.2)
VIT B12 BLD-MCNC: 706 PG/ML (ref 211–946)
WBC NRBC COR # BLD: 6.77 10*3/MM3 (ref 3.4–10.8)

## 2022-05-18 PROCEDURE — 85025 COMPLETE CBC W/AUTO DIFF WBC: CPT | Performed by: STUDENT IN AN ORGANIZED HEALTH CARE EDUCATION/TRAINING PROGRAM

## 2022-05-18 PROCEDURE — 82607 VITAMIN B-12: CPT | Performed by: STUDENT IN AN ORGANIZED HEALTH CARE EDUCATION/TRAINING PROGRAM

## 2022-05-18 PROCEDURE — 36415 COLL VENOUS BLD VENIPUNCTURE: CPT | Performed by: STUDENT IN AN ORGANIZED HEALTH CARE EDUCATION/TRAINING PROGRAM

## 2022-05-18 PROCEDURE — 99213 OFFICE O/P EST LOW 20 MIN: CPT | Performed by: STUDENT IN AN ORGANIZED HEALTH CARE EDUCATION/TRAINING PROGRAM

## 2022-05-18 PROCEDURE — 84443 ASSAY THYROID STIM HORMONE: CPT | Performed by: STUDENT IN AN ORGANIZED HEALTH CARE EDUCATION/TRAINING PROGRAM

## 2022-05-18 RX ORDER — ATORVASTATIN CALCIUM 40 MG/1
40 TABLET, FILM COATED ORAL DAILY
Qty: 90 TABLET | Refills: 0 | Status: SHIPPED | OUTPATIENT
Start: 2022-05-18 | End: 2023-01-23

## 2022-05-18 NOTE — PROGRESS NOTES
Family Medicine Residency  Tess Bermudez MD    Subjective:     Sloan Boyd is a 56 y.o. female who presents for management of her hypercholesterolemia.  As she tried to do some lifestyle modifications which has improved her cholesterol but she is we discussed that she still needs to take the medication given her previous history of stroke.  Patient patient states that she is has had memory loss recently she states that been going over the past 6 months and that she has had to have quit her job because she was unable to remember how to use the restroom.  She states that she has been she has been forgetful as at home as well but is not as noticeable because she has a routine at home.  Also notes that she sleeps by 5 hours a night.     The following portions of the patient's history were reviewed and updated as appropriate: allergies, current medications, past family history, past medical history, past social history, past surgical history and problem list.    Past Medical Hx:  Past Medical History:   Diagnosis Date   • Stroke (HCC)        Past Surgical Hx:  Past Surgical History:   Procedure Laterality Date   • ABDOMINAL SURGERY     • APPENDECTOMY     • TUBAL ABDOMINAL LIGATION         Current Meds:    Current Outpatient Medications:   •  atorvastatin (Lipitor) 40 MG tablet, Take 1 tablet by mouth Daily., Disp: 30 tablet, Rfl: 3  •  DULoxetine (Cymbalta) 60 MG capsule, Take 1 capsule by mouth Daily., Disp: 30 capsule, Rfl: 1    Allergies:  Allergies   Allergen Reactions   • Codeine Unknown - High Severity   • Contrast Dye Unknown - High Severity       Family Hx:  History reviewed. No pertinent family history.     Social History:  Social History     Socioeconomic History   • Marital status:    Tobacco Use   • Smoking status: Never Smoker   • Smokeless tobacco: Never Used   Vaping Use   • Vaping Use: Never used   Substance and Sexual Activity   • Alcohol use: Never       Review of Systems  Review of Systems  "  Constitutional: Negative for fatigue.   Respiratory: Negative for shortness of breath.    Cardiovascular: Negative for chest pain and leg swelling.   Gastrointestinal: Negative for abdominal pain, constipation, diarrhea and nausea.   Endocrine: Negative for cold intolerance.   Genitourinary: Negative for menstrual problem.   Skin: Negative for rash.   Neurological: Negative for weakness, light-headedness and numbness.       Objective:     /76   Pulse 90   Temp (!) 90.6 °F (32.6 °C)   Ht 165.1 cm (65\")   Wt 103 kg (227 lb 3.2 oz)   SpO2 97%   BMI 37.81 kg/m²   Physical Exam  Vitals reviewed.   Constitutional:       Appearance: Normal appearance.   HENT:      Head: Normocephalic and atraumatic.      Right Ear: Tympanic membrane normal.      Left Ear: Tympanic membrane normal.      Nose: Nose normal.      Mouth/Throat:      Mouth: Mucous membranes are moist.   Eyes:      Pupils: Pupils are equal, round, and reactive to light.   Cardiovascular:      Rate and Rhythm: Normal rate.      Pulses: Normal pulses.      Heart sounds: Normal heart sounds.   Pulmonary:      Effort: Pulmonary effort is normal.      Breath sounds: Normal breath sounds.   Abdominal:      General: Abdomen is flat. Bowel sounds are normal.      Palpations: Abdomen is soft.   Musculoskeletal:         General: Normal range of motion.      Cervical back: Normal range of motion and neck supple.   Skin:     General: Skin is warm and dry.      Capillary Refill: Capillary refill takes less than 2 seconds.   Neurological:      General: No focal deficit present.      Mental Status: She is alert and oriented to person, place, and time. Mental status is at baseline.   Psychiatric:         Mood and Affect: Mood normal.          Assessment/Plan:     Diagnoses and all orders for this visit:    1. Memory loss (Primary)  -     Vitamin B12  -     TSH  -     CBC w AUTO Differential  -We will do some these labs as above today. At the next visit we will do a " [place] exam and consider head CT imaging and/or referral to neurology.   -Have also advised the patient to increase hours of sleep for next visit as it may impact her memory.     2.  Hypercholesterolemia  -We will start the patient on Lipitor.    · Rx changes: As above  · Patient Education:   · Compliance at present is estimated to be good.   · Efforts to improve compliance (if necessary) will be directed at increased exercise.    Depression screening: Depression screening performed today; result negative; no follow up needed     Follow-up:     Return in about 4 weeks (around 6/15/2022).    Preventative:  Health Maintenance   Topic Date Due   • MAMMOGRAM  Never done   • COLORECTAL CANCER SCREENING  Never done   • ANNUAL PHYSICAL  Never done   • TDAP/TD VACCINES (1 - Tdap) Never done   • ZOSTER VACCINE (1 of 2) Never done   • COVID-19 Vaccine (3 - Booster for Pfizer series) 10/10/2021   • PAP SMEAR  Never done   • INFLUENZA VACCINE  08/01/2022   • LIPID PANEL  05/10/2023   • HEPATITIS C SCREENING  Completed   • Pneumococcal Vaccine 0-64  Aged Out     Female Preventative: Exercises regularly  Recommended: none  Vaccine Counseling: N/A    Weight  -Class: Obese Class II: 35-39.9kg/m2  -Class 2 Severe Obesity (BMI >=35 and <=39.9). Obesity-related health conditions include the following: dyslipidemias. Obesity is newly identified. BMI is is above average; BMI management plan is completed. We discussed portion control and increasing exercise.   eat more fruits and vegetables    Alcohol use:  reports no history of alcohol use.  Nicotine status  reports that she has never smoked. She has never used smokeless tobacco.     Goals    None         RISK SCORE: 3        This document has been electronically signed by Tess Bermudez MD on May 18, 2022 11:17 CDT

## 2022-06-10 ENCOUNTER — TELEPHONE (OUTPATIENT)
Dept: FAMILY MEDICINE CLINIC | Facility: CLINIC | Age: 57
End: 2022-06-10

## 2022-06-10 NOTE — TELEPHONE ENCOUNTER
Attempted to call the patient back and the phone number given goes directly to voicemail. Will attempt again

## 2022-06-10 NOTE — TELEPHONE ENCOUNTER
PT WANTED TO LET DR. RASHID THAT SHE IS ON HER 3RD WEEK OF WORK AND DOING WELL.    ANY QUESTIONS HER CALL BACK NUMBER [phone]

## 2023-01-23 ENCOUNTER — APPOINTMENT (OUTPATIENT)
Dept: MRI IMAGING | Facility: HOSPITAL | Age: 58
End: 2023-01-23
Payer: COMMERCIAL

## 2023-01-23 ENCOUNTER — HOSPITAL ENCOUNTER (OUTPATIENT)
Facility: HOSPITAL | Age: 58
Setting detail: OBSERVATION
Discharge: HOME | End: 2023-01-24
Attending: FAMILY MEDICINE | Admitting: HOSPITALIST
Payer: COMMERCIAL

## 2023-01-23 ENCOUNTER — APPOINTMENT (OUTPATIENT)
Dept: GENERAL RADIOLOGY | Facility: HOSPITAL | Age: 58
End: 2023-01-23
Payer: COMMERCIAL

## 2023-01-23 ENCOUNTER — APPOINTMENT (OUTPATIENT)
Dept: CT IMAGING | Facility: HOSPITAL | Age: 58
End: 2023-01-23
Payer: COMMERCIAL

## 2023-01-23 DIAGNOSIS — R47.01 EXPRESSIVE APHASIA: Primary | ICD-10-CM

## 2023-01-23 DIAGNOSIS — R51.9 NONINTRACTABLE HEADACHE, UNSPECIFIED CHRONICITY PATTERN, UNSPECIFIED HEADACHE TYPE: ICD-10-CM

## 2023-01-23 LAB
ALBUMIN SERPL-MCNC: 4.1 G/DL (ref 3.5–5.2)
ALBUMIN/GLOB SERPL: 1.1 G/DL
ALP SERPL-CCNC: 94 U/L (ref 39–117)
ALT SERPL W P-5'-P-CCNC: 15 U/L (ref 1–33)
ANION GAP SERPL CALCULATED.3IONS-SCNC: 6 MMOL/L (ref 5–15)
AST SERPL-CCNC: 19 U/L (ref 1–32)
BASOPHILS # BLD AUTO: 0.06 10*3/MM3 (ref 0–0.2)
BASOPHILS NFR BLD AUTO: 0.7 % (ref 0–1.5)
BILIRUB SERPL-MCNC: 0.4 MG/DL (ref 0–1.2)
BILIRUB UR QL STRIP: NEGATIVE
BUN SERPL-MCNC: 10 MG/DL (ref 6–20)
BUN/CREAT SERPL: 11.9 (ref 7–25)
CALCIUM SPEC-SCNC: 9.3 MG/DL (ref 8.6–10.5)
CHLORIDE SERPL-SCNC: 106 MMOL/L (ref 98–107)
CK SERPL-CCNC: 105 U/L (ref 20–180)
CLARITY UR: CLEAR
CO2 SERPL-SCNC: 28 MMOL/L (ref 22–29)
COLOR UR: YELLOW
CREAT SERPL-MCNC: 0.84 MG/DL (ref 0.57–1)
DEPRECATED RDW RBC AUTO: 44.7 FL (ref 37–54)
EGFRCR SERPLBLD CKD-EPI 2021: 80.7 ML/MIN/1.73
EOSINOPHIL # BLD AUTO: 0.16 10*3/MM3 (ref 0–0.4)
EOSINOPHIL NFR BLD AUTO: 1.7 % (ref 0.3–6.2)
ERYTHROCYTE [DISTWIDTH] IN BLOOD BY AUTOMATED COUNT: 13.6 % (ref 12.3–15.4)
GLOBULIN UR ELPH-MCNC: 3.8 GM/DL
GLUCOSE SERPL-MCNC: 95 MG/DL (ref 65–99)
GLUCOSE UR STRIP-MCNC: NEGATIVE MG/DL
HCT VFR BLD AUTO: 42.4 % (ref 34–46.6)
HGB BLD-MCNC: 13.9 G/DL (ref 12–15.9)
HGB UR QL STRIP.AUTO: NEGATIVE
HOLD SPECIMEN: NORMAL
HOLD SPECIMEN: NORMAL
IMM GRANULOCYTES # BLD AUTO: 0.03 10*3/MM3 (ref 0–0.05)
IMM GRANULOCYTES NFR BLD AUTO: 0.3 % (ref 0–0.5)
INR PPP: 1.01 (ref 0.8–1.2)
KETONES UR QL STRIP: NEGATIVE
LEUKOCYTE ESTERASE UR QL STRIP.AUTO: NEGATIVE
LYMPHOCYTES # BLD AUTO: 2.34 10*3/MM3 (ref 0.7–3.1)
LYMPHOCYTES NFR BLD AUTO: 25.5 % (ref 19.6–45.3)
MAGNESIUM SERPL-MCNC: 2.3 MG/DL (ref 1.6–2.6)
MCH RBC QN AUTO: 29.4 PG (ref 26.6–33)
MCHC RBC AUTO-ENTMCNC: 32.8 G/DL (ref 31.5–35.7)
MCV RBC AUTO: 89.6 FL (ref 79–97)
MONOCYTES # BLD AUTO: 0.51 10*3/MM3 (ref 0.1–0.9)
MONOCYTES NFR BLD AUTO: 5.6 % (ref 5–12)
NEUTROPHILS NFR BLD AUTO: 6.07 10*3/MM3 (ref 1.7–7)
NEUTROPHILS NFR BLD AUTO: 66.2 % (ref 42.7–76)
NITRITE UR QL STRIP: NEGATIVE
NRBC BLD AUTO-RTO: 0 /100 WBC (ref 0–0.2)
PH UR STRIP.AUTO: 7.5 [PH] (ref 5–9)
PLATELET # BLD AUTO: 297 10*3/MM3 (ref 140–450)
PMV BLD AUTO: 11.7 FL (ref 6–12)
POTASSIUM SERPL-SCNC: 3.9 MMOL/L (ref 3.5–5.2)
PROT SERPL-MCNC: 7.9 G/DL (ref 6–8.5)
PROT UR QL STRIP: NEGATIVE
PROTHROMBIN TIME: 13.2 SECONDS (ref 11.1–15.3)
RBC # BLD AUTO: 4.73 10*6/MM3 (ref 3.77–5.28)
SODIUM SERPL-SCNC: 140 MMOL/L (ref 136–145)
SP GR UR STRIP: 1.01 (ref 1–1.03)
TROPONIN T SERPL-MCNC: <0.01 NG/ML (ref 0–0.03)
UROBILINOGEN UR QL STRIP: NORMAL
WBC NRBC COR # BLD: 9.17 10*3/MM3 (ref 3.4–10.8)
WHOLE BLOOD HOLD SPECIMEN: NORMAL

## 2023-01-23 PROCEDURE — 70450 CT HEAD/BRAIN W/O DYE: CPT

## 2023-01-23 PROCEDURE — 84484 ASSAY OF TROPONIN QUANT: CPT | Performed by: FAMILY MEDICINE

## 2023-01-23 PROCEDURE — G0378 HOSPITAL OBSERVATION PER HR: HCPCS

## 2023-01-23 PROCEDURE — 83735 ASSAY OF MAGNESIUM: CPT | Performed by: FAMILY MEDICINE

## 2023-01-23 PROCEDURE — 81003 URINALYSIS AUTO W/O SCOPE: CPT | Performed by: FAMILY MEDICINE

## 2023-01-23 PROCEDURE — 80053 COMPREHEN METABOLIC PANEL: CPT | Performed by: FAMILY MEDICINE

## 2023-01-23 PROCEDURE — 70544 MR ANGIOGRAPHY HEAD W/O DYE: CPT

## 2023-01-23 PROCEDURE — 82550 ASSAY OF CK (CPK): CPT | Performed by: FAMILY MEDICINE

## 2023-01-23 PROCEDURE — 93005 ELECTROCARDIOGRAM TRACING: CPT | Performed by: FAMILY MEDICINE

## 2023-01-23 PROCEDURE — 71045 X-RAY EXAM CHEST 1 VIEW: CPT

## 2023-01-23 PROCEDURE — 85610 PROTHROMBIN TIME: CPT | Performed by: FAMILY MEDICINE

## 2023-01-23 PROCEDURE — 99285 EMERGENCY DEPT VISIT HI MDM: CPT

## 2023-01-23 PROCEDURE — 70551 MRI BRAIN STEM W/O DYE: CPT

## 2023-01-23 PROCEDURE — 93010 ELECTROCARDIOGRAM REPORT: CPT | Performed by: INTERNAL MEDICINE

## 2023-01-23 PROCEDURE — 85025 COMPLETE CBC W/AUTO DIFF WBC: CPT | Performed by: FAMILY MEDICINE

## 2023-01-23 RX ORDER — ACETAMINOPHEN 500 MG
1000 TABLET ORAL EVERY 6 HOURS PRN
Status: DISCONTINUED | OUTPATIENT
Start: 2023-01-23 | End: 2023-01-24 | Stop reason: HOSPADM

## 2023-01-23 RX ORDER — SODIUM CHLORIDE 9 MG/ML
50 INJECTION, SOLUTION INTRAVENOUS CONTINUOUS
Status: DISCONTINUED | OUTPATIENT
Start: 2023-01-23 | End: 2023-01-24 | Stop reason: HOSPADM

## 2023-01-23 RX ORDER — SODIUM CHLORIDE 0.9 % (FLUSH) 0.9 %
10 SYRINGE (ML) INJECTION AS NEEDED
Status: DISCONTINUED | OUTPATIENT
Start: 2023-01-23 | End: 2023-01-24 | Stop reason: HOSPADM

## 2023-01-23 RX ORDER — SODIUM CHLORIDE 0.9 % (FLUSH) 0.9 %
10 SYRINGE (ML) INJECTION EVERY 12 HOURS SCHEDULED
Status: DISCONTINUED | OUTPATIENT
Start: 2023-01-23 | End: 2023-01-24 | Stop reason: HOSPADM

## 2023-01-23 RX ORDER — ONDANSETRON 2 MG/ML
4 INJECTION INTRAMUSCULAR; INTRAVENOUS EVERY 6 HOURS PRN
Status: DISCONTINUED | OUTPATIENT
Start: 2023-01-23 | End: 2023-01-24 | Stop reason: HOSPADM

## 2023-01-23 RX ORDER — SODIUM CHLORIDE 9 MG/ML
40 INJECTION, SOLUTION INTRAVENOUS AS NEEDED
Status: DISCONTINUED | OUTPATIENT
Start: 2023-01-23 | End: 2023-01-24 | Stop reason: HOSPADM

## 2023-01-23 RX ADMIN — SODIUM CHLORIDE 50 ML/HR: 9 INJECTION, SOLUTION INTRAVENOUS at 20:57

## 2023-01-23 RX ADMIN — ACETAMINOPHEN 1000 MG: 500 TABLET ORAL at 20:53

## 2023-01-23 RX ADMIN — Medication 10 ML: at 21:22

## 2023-01-23 NOTE — LETTER
January 24, 2023     Patient: Sloan Boyd   YOB: 1965   Date of Visit: 1/23/2023       To Whom It May Concern:    It is my medical opinion that Sloan Boyd may return to work 1/30/2023           Sincerely,        No name on file    CC: No Recipients

## 2023-01-23 NOTE — ED NOTES
"Nursing report ED to floor  Sloan Boyd  57 y.o.  female    HPI:   Chief Complaint   Patient presents with    Speech Problem    Headache    Gait Problem       Admitting doctor:   Pietro Chowdary MD    Consulting provider(s):  Consults       Date and Time Order Name Status Description    1/23/2023  4:46 PM Hospitalist (on-call MD unless specified)      1/23/2023  1:43 PM Inpatient Neurology Consult Stroke               Admitting diagnosis:   The primary encounter diagnosis was Expressive aphasia. A diagnosis of Nonintractable headache, unspecified chronicity pattern, unspecified headache type was also pertinent to this visit.    Code status:   Current Code Status       Date Active Code Status Order ID Comments User Context       Not on file            Allergies:   Codeine and Contrast dye (echo or unknown ct/mr)    Intake and Output  No intake or output data in the 24 hours ending 01/23/23 1648    Weight:       01/23/23  1330   Weight: 74.1 kg (163 lb 4.8 oz)       Most recent vitals:   Vitals:    01/23/23 1330 01/23/23 1510 01/23/23 1529 01/23/23 1559   BP:  158/94 163/79 147/82   BP Location:       Patient Position:       Pulse:  62 66 66   Resp:  16  14   Temp:   97.3 °F (36.3 °C)    TempSrc:   Oral    SpO2:  97% 93% 94%   Weight: 74.1 kg (163 lb 4.8 oz)      Height: 162.6 cm (64\")        Oxygen Therapy: Room air    Active LDAs/IV Access:   Lines, Drains & Airways       Active LDAs       Name Placement date Placement time Site Days    Peripheral IV 01/23/23 1331 Right Antecubital 01/23/23  1331  Antecubital  less than 1                    Labs (abnormal labs have a star):   Labs Reviewed   PROTIME-INR - Normal    Narrative:     Therapeutic range for most indications is 2.0-3.0 INR,  or 2.5-3.5 for mechanical heart valves.   URINALYSIS W/ CULTURE IF INDICATED - Normal    Narrative:     In absence of clinical symptoms, the presence of pyuria, bacteria, and/or nitrites on the urinalysis result does not " correlate with infection.  Urine microscopic not indicated.   CK - Normal   TROPONIN (IN-HOUSE) - Normal    Narrative:     Troponin T Reference Range:  <= 0.03 ng/mL-   Negative for AMI  >0.03 ng/mL-     Abnormal for myocardial necrosis.  Clinicians would have to utilize clinical acumen, EKG, Troponin and serial changes to determine if it is an Acute Myocardial Infarction or myocardial injury due to an underlying chronic condition.       Results may be falsely decreased if patient taking Biotin.     MAGNESIUM - Normal   CBC WITH AUTO DIFFERENTIAL - Normal   COMPREHENSIVE METABOLIC PANEL    Narrative:     GFR Normal >60  Chronic Kidney Disease <60  Kidney Failure <15     CBC AND DIFFERENTIAL    Narrative:     The following orders were created for panel order CBC & Differential.  Procedure                               Abnormality         Status                     ---------                               -----------         ------                     CBC Auto Differential[[id number]]        Normal              Final result               Scan Slide[[id number]]                                                                    Please view results for these tests on the individual orders.   LAVENDER TOP   GOLD TOP - SST   EXTRA TUBES    Narrative:     The following orders were created for panel order Extra Tubes.  Procedure                               Abnormality         Status                     ---------                               -----------         ------                     Lavender Top[[id number]]                                     Final result               Gold Top - SST[[id number]]                                   Final result               Gray Top[[id number]]                                         In process                   Please view results for these tests on the individual orders.   GRAY TOP       Meds given in ED:   Medications - No data to display  No current facility-administered medications for  this encounter.       NIH Stroke Scale:  Interval: baseline    Isolation/Infection(s):  No active isolations   No active infections     COVID Testing  Collected N/A  Resulted N/A    Nursing report ED to floor:  Mental status: Answering questions appropriately at this time but with slurred speech, very confused on arrival  Ambulatory status: Normally ambulates w/o assistance, today unable to ambulate  Precautions: None    ED nurse phone extentsion- [phone]

## 2023-01-23 NOTE — CONSULTS
Teleneurology Phone Consultation Note    Date of Consultation: 1/23/2023  Requesting Attending: Sanket Fry MD  Chief Complaint: general weakness  Location of patient: Emergency Room  Last known well date/Time if applicable: 1/22/23 2200  Date/Time Telestroke Doctor on phone: 1/23/2023 1338 ET    Assessment and Plan:  TNK decision: No Outside window for tPA  Intervention Decision: No pending MRA    Impression:   58 yo female was found confused not talking this morning by  on waking LKW was last night prior to sleep  She reportedly had trouble walking this morning as well and fell in the early morning as well.    No reported focal weakness now, reports talking with nursing  Cannot get CTA due to contrast allergy    Likely not stroke but metabolic    Recommendation:   1. MRI + MRA head  If mri neg for stroke, continue metabolic and infectious workup  PT/oT    Maynor Philip MD  If you have any questions about the patient recommendations, please call [phone] at anytime and ask to speak with the neurologist on call. Press 1 for an emergent consult and 2 for a non-emergent consult.    Teleneurology Disclaimer  Initial recommendations are based on my read of the source images. As additional reconstructions are generated, the final report of the neuroimages may change. Primary service to contact me back if the final report from radiology differs from my preliminary reading.     Also, these recommendations were developed based on phone conversation with the patient provider at the time of the initial consultation. The treatment plan and investigations may need to be modified based on additional information that may become available during the course of this hospitalization. We defer further workup/management to the local neurology team.     I, Maynor Philip MD, was consulted about this patient on 1/23/2023 for discussion over the phone regarding management of the patient's care via a provider to  provider discussion. The location of the patient was at [facility] My location was [place]. The recommendations are based off information I received from the consulting provider.

## 2023-01-23 NOTE — LETTER
January 24, 2023     Patient: Sloan Boyd   YOB: 1965   Date of Visit: 1/23/2023       To Whom It May Concern:    It is my medical opinion that after hospitalization on 1/23/23, that Sloan Boyd may return to work on 1/30/32 with no restrictions.         Sincerely,    Pietro Chowdary MD    No name on file    CC: No Recipients

## 2023-01-23 NOTE — ED NOTES
"Pt's  states that around 0900 pt began acting confused, slurring her speech, became weak, having difficulty walking, and complaining of headache. Pt states that \"before daylight,\" she fell in the bathtub and hit her head.  was asleep and did not witness incident.   "

## 2023-01-23 NOTE — ED PROVIDER NOTES
Subjective   History of Present Illness  Patient's  states that she woke up around 9 AM this morning with slurred speech and confusion.  Patient has a history of chronic migraine headaches.  Her last known normal was at 8 PM last night when she went to bed.    Headache  Pain location:  Generalized  Associated symptoms: no abdominal pain, no congestion, no cough, no diarrhea, no dizziness, no ear pain, no fatigue, no fever, no myalgias, no nausea, no neck pain, no seizures, no sinus pressure, no sore throat, no vomiting and no weakness    Gait Problem  Associated symptoms: headaches    Associated symptoms: no abdominal pain, no chest pain, no congestion, no cough, no diarrhea, no ear pain, no fatigue, no fever, no myalgias, no nausea, no rash, no rhinorrhea, no shortness of breath, no sore throat, no vomiting and no wheezing        Review of Systems   Constitutional: Positive for activity change. Negative for appetite change, chills, diaphoresis, fatigue and fever.   HENT: Negative for congestion, ear discharge, ear pain, nosebleeds, rhinorrhea, sinus pressure, sore throat and trouble swallowing.    Eyes: Negative for discharge and redness.   Respiratory: Negative for apnea, cough, chest tightness, shortness of breath and wheezing.    Cardiovascular: Negative for chest pain.   Gastrointestinal: Negative for abdominal pain, diarrhea, nausea and vomiting.   Endocrine: Negative for polyuria.   Genitourinary: Negative for dysuria, frequency and urgency.   Musculoskeletal: Negative for myalgias and neck pain.   Skin: Negative for color change and rash.   Allergic/Immunologic: Negative for immunocompromised state.   Neurological: Positive for speech difficulty and headaches. Negative for dizziness, seizures, syncope, weakness and light-headedness.   Hematological: Negative for adenopathy. Does not bruise/bleed easily.   Psychiatric/Behavioral: Negative for behavioral problems and confusion.   All other systems  reviewed and are negative.      Past Medical History:   Diagnosis Date   • Stroke (HCC)        Allergies   Allergen Reactions   • Codeine Unknown - High Severity   • Contrast Dye (Echo Or Unknown Ct/Mr) Unknown - High Severity       Past Surgical History:   Procedure Laterality Date   • ABDOMINAL SURGERY     • APPENDECTOMY     • TUBAL ABDOMINAL LIGATION         History reviewed. No pertinent family history.    Social History     Socioeconomic History   • Marital status:    Tobacco Use   • Smoking status: Never   • Smokeless tobacco: Never   Vaping Use   • Vaping Use: Never used   Substance and Sexual Activity   • Alcohol use: Never   • Drug use: Never           Objective   Physical Exam  Vitals and nursing note reviewed.   Constitutional:       Appearance: She is well-developed.   HENT:      Head: Normocephalic and atraumatic.      Nose: Nose normal.   Eyes:      General: No scleral icterus.        Right eye: No discharge.         Left eye: No discharge.      Conjunctiva/sclera: Conjunctivae normal.      Pupils: Pupils are equal, round, and reactive to light.   Neck:      Trachea: No tracheal deviation.   Cardiovascular:      Rate and Rhythm: Normal rate and regular rhythm.      Heart sounds: Normal heart sounds. No murmur heard.  Pulmonary:      Effort: Pulmonary effort is normal. No respiratory distress.      Breath sounds: Normal breath sounds. No stridor. No wheezing or rales.   Abdominal:      General: Bowel sounds are normal. There is no distension.      Palpations: Abdomen is soft. There is no mass.      Tenderness: There is no abdominal tenderness. There is no guarding or rebound.   Musculoskeletal:      Cervical back: Normal range of motion and neck supple.   Skin:     General: Skin is warm and dry.      Findings: No erythema or rash.   Neurological:      Mental Status: She is alert. She is confused.      GCS: GCS eye subscore is 4. GCS verbal subscore is 5. GCS motor subscore is 6.       Coordination: Coordination normal.      Comments: Slurred speech     Psychiatric:         Behavior: Behavior normal.         Thought Content: Thought content normal.         ECG 12 Lead      Date/Time: 1/23/2023 4:47 PM  Performed by: Sanket Fry MD  Authorized by: Sanket Fry MD   Interpreted by physician  Rhythm: sinus rhythm  BPM: 69  ST Segments: ST segments normal                   ED Course              Labs Reviewed   PROTIME-INR - Normal    Narrative:     Therapeutic range for most indications is 2.0-3.0 INR,  or 2.5-3.5 for mechanical heart valves.   URINALYSIS W/ CULTURE IF INDICATED - Normal    Narrative:     In absence of clinical symptoms, the presence of pyuria, bacteria, and/or nitrites on the urinalysis result does not correlate with infection.  Urine microscopic not indicated.   CK - Normal   TROPONIN (IN-HOUSE) - Normal    Narrative:     Troponin T Reference Range:  <= 0.03 ng/mL-   Negative for AMI  >0.03 ng/mL-     Abnormal for myocardial necrosis.  Clinicians would have to utilize clinical acumen, EKG, Troponin and serial changes to determine if it is an Acute Myocardial Infarction or myocardial injury due to an underlying chronic condition.       Results may be falsely decreased if patient taking Biotin.     MAGNESIUM - Normal   CBC WITH AUTO DIFFERENTIAL - Normal   COMPREHENSIVE METABOLIC PANEL    Narrative:     GFR Normal >60  Chronic Kidney Disease <60  Kidney Failure <15     URINE DRUG SCREEN   BASIC METABOLIC PANEL   VITAMIN B12   FOLATE   PHOSPHORUS   IRON PROFILE   CBC WITH AUTO DIFFERENTIAL   CBC AND DIFFERENTIAL    Narrative:     The following orders were created for panel order CBC & Differential.  Procedure                               Abnormality         Status                     ---------                               -----------         ------                     CBC Auto Differential[[id number]]        Normal              Final result               Scan  Slide[[id number]]                                                                    Please view results for these tests on the individual orders.   EXTRA TUBES    Narrative:     The following orders were created for panel order Extra Tubes.  Procedure                               Abnormality         Status                     ---------                               -----------         ------                     Lavender Top[[id number]]                                     Final result               Gold Top - SST[[id number]]                                   Final result               Gray Top[[id number]]                                         Final result                 Please view results for these tests on the individual orders.   LAVENDER TOP   GOLD TOP - SST   GRAY TOP   CBC AND DIFFERENTIAL    Narrative:     The following orders were created for panel order CBC & Differential.  Procedure                               Abnormality         Status                     ---------                               -----------         ------                     CBC Auto Differential[[id number]]                                                         Please view results for these tests on the individual orders.       MRI Brain Without Contrast   Final Result   Age-appropriate cerebral atrophy      Chronic microangiopathic changes.      No evidence of acute intracranial process.      Electronically signed by:  Doe Fierro MD  1/23/2023 3:53 PM CST   Workstation: [id number]      MRI Angiogram Head Without Contrast   Final Result   No evidence of hemodynamically significant stenosis of the   visualized intracranial arteries.   No evidence of aneurysm within the [place] [facility] [place].      Electronically signed by:  Doe Fierro MD  1/23/2023 3:47 PM CST   Workstation: [id number]      XR Chest 1 View   Final Result   CONCLUSION:   No Acute Disease      [id number]      Electronically signed by:  Chris Sue MD  1/23/2023 1:58 PM CST    Workstation: [id number]      CT Head Without Contrast Stroke Protocol   Final Result   IMPRESSION   No acute intracranial abnormality      Electronically signed by:  Tobi Aguirre DO  1/23/2023 1:28 PM   CST Workstation: [id number]                                            Medical Decision Making  Expressive aphasia: acute illness or injury  Nonintractable headache, unspecified chronicity pattern, unspecified headache type: acute illness or injury  Amount and/or Complexity of Data Reviewed  Labs: ordered.  Radiology: ordered.  ECG/medicine tests: ordered and independent interpretation performed.      Risk  Decision regarding hospitalization.          Final diagnoses:   Expressive aphasia   Nonintractable headache, unspecified chronicity pattern, unspecified headache type       ED Disposition  ED Disposition     ED Disposition   Decision to Admit    Condition   --    Comment   Level of Care: Stepdown [25]   Diagnosis: Expressive aphasia [446116]   Admitting Physician: MANDEEP HERNANDEZ [[id number]]   Attending Physician: MANDEEP HERNANDEZ [[id number]]               No follow-up provider specified.       Medication List      No changes were made to your prescriptions during this visit.          Sanket Fry MD  01/24/23 0026

## 2023-01-24 ENCOUNTER — READMISSION MANAGEMENT (OUTPATIENT)
Dept: CALL CENTER | Facility: HOSPITAL | Age: 58
End: 2023-01-24
Payer: COMMERCIAL

## 2023-01-24 VITALS
WEIGHT: 226.41 LBS | TEMPERATURE: 98.3 F | HEART RATE: 79 BPM | HEIGHT: 64 IN | RESPIRATION RATE: 15 BRPM | BODY MASS INDEX: 38.65 KG/M2 | OXYGEN SATURATION: 98 % | SYSTOLIC BLOOD PRESSURE: 133 MMHG | DIASTOLIC BLOOD PRESSURE: 74 MMHG

## 2023-01-24 LAB
AMPHET+METHAMPHET UR QL: NEGATIVE
AMPHETAMINES UR QL: NEGATIVE
ANION GAP SERPL CALCULATED.3IONS-SCNC: 5 MMOL/L (ref 5–15)
BARBITURATES UR QL SCN: NEGATIVE
BASOPHILS # BLD AUTO: 0.05 10*3/MM3 (ref 0–0.2)
BASOPHILS NFR BLD AUTO: 0.7 % (ref 0–1.5)
BENZODIAZ UR QL SCN: NEGATIVE
BUN SERPL-MCNC: 9 MG/DL (ref 6–20)
BUN/CREAT SERPL: 11.1 (ref 7–25)
BUPRENORPHINE SERPL-MCNC: NEGATIVE NG/ML
CALCIUM SPEC-SCNC: 9 MG/DL (ref 8.6–10.5)
CANNABINOIDS SERPL QL: NEGATIVE
CHLORIDE SERPL-SCNC: 104 MMOL/L (ref 98–107)
CO2 SERPL-SCNC: 28 MMOL/L (ref 22–29)
COCAINE UR QL: NEGATIVE
CREAT SERPL-MCNC: 0.81 MG/DL (ref 0.57–1)
DEPRECATED RDW RBC AUTO: 43.7 FL (ref 37–54)
EGFRCR SERPLBLD CKD-EPI 2021: 84.3 ML/MIN/1.73
EOSINOPHIL # BLD AUTO: 0.18 10*3/MM3 (ref 0–0.4)
EOSINOPHIL NFR BLD AUTO: 2.4 % (ref 0.3–6.2)
ERYTHROCYTE [DISTWIDTH] IN BLOOD BY AUTOMATED COUNT: 13.4 % (ref 12.3–15.4)
FOLATE SERPL-MCNC: 13.9 NG/ML (ref 4.78–24.2)
GLUCOSE SERPL-MCNC: 110 MG/DL (ref 65–99)
HCT VFR BLD AUTO: 38.6 % (ref 34–46.6)
HGB BLD-MCNC: 12.7 G/DL (ref 12–15.9)
IMM GRANULOCYTES # BLD AUTO: 0.02 10*3/MM3 (ref 0–0.05)
IMM GRANULOCYTES NFR BLD AUTO: 0.3 % (ref 0–0.5)
IRON 24H UR-MRATE: 34 MCG/DL (ref 37–145)
IRON SATN MFR SERPL: 12 % (ref 20–50)
LYMPHOCYTES # BLD AUTO: 2.32 10*3/MM3 (ref 0.7–3.1)
LYMPHOCYTES NFR BLD AUTO: 30.5 % (ref 19.6–45.3)
MCH RBC QN AUTO: 29.1 PG (ref 26.6–33)
MCHC RBC AUTO-ENTMCNC: 32.9 G/DL (ref 31.5–35.7)
MCV RBC AUTO: 88.5 FL (ref 79–97)
METHADONE UR QL SCN: NEGATIVE
MONOCYTES # BLD AUTO: 0.63 10*3/MM3 (ref 0.1–0.9)
MONOCYTES NFR BLD AUTO: 8.3 % (ref 5–12)
NEUTROPHILS NFR BLD AUTO: 4.41 10*3/MM3 (ref 1.7–7)
NEUTROPHILS NFR BLD AUTO: 57.8 % (ref 42.7–76)
NRBC BLD AUTO-RTO: 0 /100 WBC (ref 0–0.2)
OPIATES UR QL: NEGATIVE
OXYCODONE UR QL SCN: NEGATIVE
PCP UR QL SCN: NEGATIVE
PHOSPHATE SERPL-MCNC: 3.6 MG/DL (ref 2.5–4.5)
PLATELET # BLD AUTO: 275 10*3/MM3 (ref 140–450)
PMV BLD AUTO: 11.1 FL (ref 6–12)
POTASSIUM SERPL-SCNC: 3.9 MMOL/L (ref 3.5–5.2)
PROPOXYPH UR QL: NEGATIVE
RBC # BLD AUTO: 4.36 10*6/MM3 (ref 3.77–5.28)
SODIUM SERPL-SCNC: 137 MMOL/L (ref 136–145)
TIBC SERPL-MCNC: 283 MCG/DL (ref 298–536)
TRANSFERRIN SERPL-MCNC: 190 MG/DL (ref 200–360)
TRICYCLICS UR QL SCN: NEGATIVE
VIT B12 BLD-MCNC: 751 PG/ML (ref 211–946)
WBC NRBC COR # BLD: 7.61 10*3/MM3 (ref 3.4–10.8)

## 2023-01-24 PROCEDURE — 83540 ASSAY OF IRON: CPT | Performed by: INTERNAL MEDICINE

## 2023-01-24 PROCEDURE — 80048 BASIC METABOLIC PNL TOTAL CA: CPT | Performed by: HOSPITALIST

## 2023-01-24 PROCEDURE — 99204 OFFICE O/P NEW MOD 45 MIN: CPT | Performed by: NURSE PRACTITIONER

## 2023-01-24 PROCEDURE — 84100 ASSAY OF PHOSPHORUS: CPT | Performed by: INTERNAL MEDICINE

## 2023-01-24 PROCEDURE — 82607 VITAMIN B-12: CPT | Performed by: INTERNAL MEDICINE

## 2023-01-24 PROCEDURE — G0378 HOSPITAL OBSERVATION PER HR: HCPCS

## 2023-01-24 PROCEDURE — 82746 ASSAY OF FOLIC ACID SERUM: CPT | Performed by: INTERNAL MEDICINE

## 2023-01-24 PROCEDURE — 84466 ASSAY OF TRANSFERRIN: CPT | Performed by: INTERNAL MEDICINE

## 2023-01-24 PROCEDURE — 85025 COMPLETE CBC W/AUTO DIFF WBC: CPT | Performed by: HOSPITALIST

## 2023-01-24 PROCEDURE — 80306 DRUG TEST PRSMV INSTRMNT: CPT | Performed by: INTERNAL MEDICINE

## 2023-01-24 RX ORDER — ROSUVASTATIN CALCIUM 20 MG/1
20 TABLET, COATED ORAL DAILY
Qty: 90 TABLET | Refills: 0 | Status: SHIPPED | OUTPATIENT
Start: 2023-01-24

## 2023-01-24 RX ORDER — SUMATRIPTAN 50 MG/1
TABLET, FILM COATED ORAL
Qty: 15 TABLET | Refills: 0 | Status: SHIPPED | OUTPATIENT
Start: 2023-01-24

## 2023-01-24 RX ORDER — MAGNESIUM OXIDE 400 MG/1
400 TABLET ORAL DAILY
Qty: 30 TABLET | Refills: 0 | Status: SHIPPED | OUTPATIENT
Start: 2023-01-24

## 2023-01-24 NOTE — OUTREACH NOTE
Prep Survey    Flowsheet Row Responses   [facility] patient discharged from? [place]   Is LACE score < 7 ? Yes   Eligibility [facility]   Date of Admission 01/23/23   Date of Discharge 01/24/23   Discharge Disposition Home or Self Care   Discharge diagnosis Expressive aphasia   Does the patient have one of the following disease processes/diagnoses(primary or secondary)? Other   Does the patient have Home health ordered? No   Is there a DME ordered? No   Prep survey completed? Yes          MARCNI BERNARD - Registered Nurse

## 2023-01-24 NOTE — H&P
[facility] Medicine Admission      Date of Admission: 1/23/2023      Primary Care Physician: Tess Bermudez MD      Chief Complaint: Fall, expressive aphasia    HPI: Patient is a 57-year-old female that has a past medical history of stroke who presented to the emergency department after a fall with resulting confusion.  Patient has significant expressive aphasia and the history is severely limited due to this.  Patient states that she got up before daylight and went to the restroom.  She had diarrhea and then she fell.  She states she hit her head on the bathtub.  She is unsure if she lost consciousness or not.  Patient's  told neurology consultant that she was found this morning confused and not talking.  There is some report that she had trouble walking prior to her fall early in the morning.    Concurrent Medical History:  has a past medical history of Stroke (HCC).    Past Surgical History:  has a past surgical history that includes Appendectomy; Abdominal surgery; and Tubal ligation.    Family History: Unable to obtain due to patient's medical condition.    Social History:  reports that she has never smoked. She has never used smokeless tobacco. She reports that she does not drink alcohol and does not use drugs.    Allergies:   Allergies   Allergen Reactions   • Codeine Unknown - High Severity   • Contrast Dye (Echo Or Unknown Ct/Mr) Unknown - High Severity       Medications:   Prior to Admission medications    Medication Sig Start Date End Date Taking? Authorizing Provider   atorvastatin (Lipitor) 40 MG tablet Take 1 tablet by mouth Daily. 5/18/22 1/23/23  Sam Huerta MD   DULoxetine (Cymbalta) 60 MG capsule Take 1 capsule by mouth Daily. 3/8/22 1/23/23  Freedom Short MD     I have utilized all available immediate resources to obtain, update, or review the patient's current medications (including all prescriptions, over-the-counter  products, herbals, cannabis/cannabidiol products, and vitamin/mineral/dietary (nutritional) supplements).      Review of Systems:  Review of Systems   Unable to perform ROS: Acuity of condition      Otherwise complete ROS is negative except as mentioned above.    Physical Exam:   Temp:  [97.3 °F (36.3 °C)-97.6 °F (36.4 °C)] 97.6 °F (36.4 °C)  Heart Rate:  [62-84] 74  Resp:  [14-20] 16  BP: (145-163)/() 163/81  Physical Exam  Constitutional:       Appearance: She is well-developed.   HENT:      Head: Normocephalic and atraumatic.      Nose: Nose normal.   Eyes:      General: Lids are normal. No scleral icterus.     Conjunctiva/sclera: Conjunctivae normal.      Pupils: Pupils are equal, round, and reactive to light.   Neck:      Vascular: No JVD.      Trachea: No tracheal tenderness or tracheal deviation.   Cardiovascular:      Rate and Rhythm: Normal rate and regular rhythm.      Pulses: Normal pulses.      Heart sounds: Normal heart sounds, S1 normal and S2 normal. No murmur heard.    No friction rub. No gallop.   Pulmonary:      Effort: Pulmonary effort is normal. No accessory muscle usage or respiratory distress.      Breath sounds: Normal breath sounds. No decreased breath sounds, wheezing or rales.   Chest:      Chest wall: No tenderness.   Abdominal:      General: Bowel sounds are normal. There is no distension.      Palpations: Abdomen is soft. There is no mass.      Tenderness: There is no abdominal tenderness. There is no guarding or rebound.   Musculoskeletal:         General: No tenderness.      Cervical back: Normal range of motion and neck supple. No spinous process tenderness.   Skin:     General: Skin is warm.      Coloration: Skin is not pale.      Findings: No rash.   Neurological:      Mental Status: She is alert and oriented to person, place, and time.      Cranial Nerves: Cranial nerve deficit present.      Sensory: No sensory deficit.      Motor: Weakness present.      Comments: Significant  expressive aphasia, left-sided weakness, left tongue deviation   Psychiatric:         Behavior: Behavior normal.         Thought Content: Thought content normal.         Judgment: Judgment normal.           Results Reviewed:  I have personally reviewed current lab, radiology, and data and agree with results.  Lab Results (last 24 hours)     Procedure Component Value Units Date/Time    Extra Tubes [[id number]] Collected: 01/23/23 1332    Specimen: Blood, Venous Line Updated: 01/23/23 1745    Narrative:      The following orders were created for panel order Extra Tubes.  Procedure                               Abnormality         Status                     ---------                               -----------         ------                     Lavender Top[[id number]]                                     Final result               Gold Top - [facility][[id number]]                                   Final result               Gray Top[[id number]]                                         Final result                 Please view results for these tests on the individual orders.    Gray Top [[id number]] Collected: 01/23/23 1332    Specimen: Blood Updated: 01/23/23 1745     Extra Tube Hold for add-ons.     Comment: Auto resulted.       Urinalysis With Culture If Indicated - Urine, Clean Catch [[id number]]  (Normal) Collected: 01/23/23 1636    Specimen: Urine, Clean Catch Updated: 01/23/23 1644     Color, UA Yellow     Appearance, UA Clear     pH, UA 7.5     Specific Gravity, UA 1.012     Glucose, UA Negative     Ketones, UA Negative     Bilirubin, UA Negative     Blood, UA Negative     Protein, UA Negative     Leuk Esterase, UA Negative     Nitrite, UA Negative     Urobilinogen, UA 0.2 E.U./dL    Narrative:      In absence of clinical symptoms, the presence of pyuria, bacteria, and/or nitrites on the urinalysis result does not correlate with infection.  Urine microscopic not indicated.    [facility] [[id number]] Collected: 01/23/23 1332     Specimen: Blood Updated: 01/23/23 1445     Extra Tube Hold for add-ons.     Comment: Auto resulted.       Erica Top [[id number]] Collected: 01/23/23 1332    Specimen: Blood Updated: 01/23/23 1445     Extra Tube hold for add-on     Comment: Auto resulted       Comprehensive Metabolic Panel [[id number]] Collected: 01/23/23 1332    Specimen: Blood Updated: 01/23/23 1405     Glucose 95 mg/dL      BUN 10 mg/dL      Creatinine 0.84 mg/dL      Sodium 140 mmol/L      Potassium 3.9 mmol/L      Chloride 106 mmol/L      CO2 28.0 mmol/L      Calcium 9.3 mg/dL      Total Protein 7.9 g/dL      Albumin 4.1 g/dL      ALT (SGPT) 15 U/L      AST (SGOT) 19 U/L      Alkaline Phosphatase 94 U/L      Total Bilirubin 0.4 mg/dL      Globulin 3.8 gm/dL      A/G Ratio 1.1 g/dL      BUN/Creatinine Ratio 11.9     Anion Gap 6.0 mmol/L      eGFR 80.7 mL/min/1.73     Narrative:      GFR Normal >60  Chronic Kidney Disease <60  Kidney Failure <15      CK [[id number]]  (Normal) Collected: 01/23/23 1332    Specimen: Blood Updated: 01/23/23 1405     Creatine Kinase 105 U/L     Magnesium [[id number]]  (Normal) Collected: 01/23/23 1332    Specimen: Blood Updated: 01/23/23 1405     Magnesium 2.3 mg/dL     Troponin [[id number]]  (Normal) Collected: 01/23/23 1332    Specimen: Blood Updated: 01/23/23 1404     Troponin T <0.010 ng/mL     Narrative:      Troponin T Reference Range:  <= 0.03 ng/mL-   Negative for AMI  >0.03 ng/mL-     Abnormal for myocardial necrosis.  Clinicians would have to utilize clinical acumen, EKG, Troponin and serial changes to determine if it is an Acute Myocardial Infarction or myocardial injury due to an underlying chronic condition.       Results may be falsely decreased if patient taking Biotin.      Protime-INR [[id number]]  (Normal) Collected: 01/23/23 1332    Specimen: Blood Updated: 01/23/23 1358     Protime 13.2 Seconds      INR 1.01    Narrative:      Therapeutic range for most indications is 2.0-3.0 INR,  or 2.5-3.5 for  mechanical heart valves.    CBC & Differential [[id number]] Collected: 01/23/23 1332    Specimen: Blood Updated: 01/23/23 1351    Narrative:      The following orders were created for panel order CBC & Differential.  Procedure                               Abnormality         Status                     ---------                               -----------         ------                     CBC Auto Differential[[id number]]        Normal              Final result               Scan Slide[[id number]]                                                                    Please view results for these tests on the individual orders.    CBC Auto Differential [[id number]]  (Normal) Collected: 01/23/23 1332    Specimen: Blood Updated: 01/23/23 1350     WBC 9.17 10*3/mm3      RBC 4.73 10*6/mm3      Hemoglobin 13.9 g/dL      Hematocrit 42.4 %      MCV 89.6 fL      MCH 29.4 pg      MCHC 32.8 g/dL      RDW 13.6 %      RDW-SD 44.7 fl      MPV 11.7 fL      Platelets 297 10*3/mm3      Neutrophil % 66.2 %      Lymphocyte % 25.5 %      Monocyte % 5.6 %      Eosinophil % 1.7 %      Basophil % 0.7 %      Immature Grans % 0.3 %      Neutrophils, Absolute 6.07 10*3/mm3      Lymphocytes, Absolute 2.34 10*3/mm3      Monocytes, Absolute 0.51 10*3/mm3      Eosinophils, Absolute 0.16 10*3/mm3      Basophils, Absolute 0.06 10*3/mm3      Immature Grans, Absolute 0.03 10*3/mm3      nRBC 0.0 /100 WBC         Imaging Results (Last 24 Hours)     Procedure Component Value Units Date/Time    MRI Brain Without Contrast [[id number]] Collected: 01/23/23 1359     Updated: 01/23/23 1551    Narrative:      EXAM:  MR BRAIN WITHOUT IV CONTRAST    ORDERING PROVIDER:  RORY WHITE    CLINICAL HISTORY:   Stroke    COMPARISON:   1/22/2020    TECHNIQUE:   Axial T1-weighted, T2-weighted and diffusion weighted, FLAIR, and  sagittal T1-weighted images,  T2 gradient echo as well as axial  and coronal T1 weight images with no IV contrast were obtained  using a 1.5 Annita  MRI machine.    FINDINGS:    CEREBRAL PARENCHYMA: Scattered T2 and FLAIR hyperintensities.  These are consistent with chronic microangiopathic changes. No  encephalomalacia, mass or gliosis. Mild age dependent cerebral  atrophy.    POSTERIOR FOSSA: No mass or abnormal signal. Unremarkable  craniocervical junction.    EXTRA-AXIAL SPACES: No mass. Unremarkable contour.    PITUITARY: No mass or parasellar abnormality.    ORBITS: No mass. Unremarkable extraocular muscles, globe and  optic nerve.    CALVARIA AND SOFT TISSUES:  No mass, adenopathy, or abnormal  signal.    TEMPORAL BONE AND SKULL BASE: Unremarkable mastoid air cells,  inner and middle ear.    PARANASAL SINUSES: Unremarkable.    VASCULAR STRUCTURES: Normal flow voids are present..    DIFFUSION WEIGHTED IMAGES: No restricted diffusion.    SUSCEPTIBILITY SEQUENCE: No susceptibility artifact.      Impression:      Age-appropriate cerebral atrophy    Chronic microangiopathic changes.    No evidence of acute intracranial process.    Electronically signed by:  Doe Fierro MD  1/23/2023 3:53 PM CST  Workstation: [id number]    MRI Angiogram Head Without Contrast [[id number]] Collected: 01/23/23 1359     Updated: 01/23/23 1550    Narrative:      EXAM:  MR BRAIN ANGIOGRAPHY WITHOUT IV CONTRAST    ORDERING PROVIDER:  RORY WHITE    CLINICAL HISTORY:  Stroke    COMPARISON:       TECHNIQUE:   Time-of-flight MRA of the brain was performed. Images were  reprojected into multiple planes using MIPS reprojections and  viewed in cine mode.    FINDINGS:     INTERNAL CAROTID ARTERIES: Normal course and caliber. No aneurysm  or significant stenosis.    ANTERIOR COMMUNICATING ARTERY: Patent.    POSTERIOR COMMUNICATING ARTERIES: Patent.    ANTERIOR, MIDDLE AND POSTERIOR CEREBRAL ARTERIES: Normal course  and caliber. No aneurysm or significant stenosis.    VERTEBRAL BASILAR SYSTEM: Distal vertebral arteries are left  dominant and patent. Basilar artery is normal caliber and  patent.  Anterior inferior, posterior inferior and superior cerebellar  arteries are unremarkable.  No aneurysm.      Impression:      No evidence of hemodynamically significant stenosis of the  visualized intracranial arteries.  No evidence of aneurysm within the [place].    Electronically signed by:  Doe Fierro MD  1/23/2023 3:47 PM CST  Workstation: [id number]    XR Chest 1 View [[id number]] Collected: 01/23/23 1336     Updated: 01/23/23 1401    Narrative:        PORTABLE CHEST    HISTORY: Chest pain    Portable AP upright film of the chest was obtained at 1:34 PM.  COMPARISON: March 8, 2022    FINDINGS:   EKG leads.  The lungs are clear of an acute process.  The heart is not enlarged.  The pulmonary vasculature is not increased.  No pleural effusion.  No pneumothorax.  No acute osseous abnormality.  Degenerative changes are present in the thoracic spine.  Hypertrophic change acromioclavicular joints.      Impression:      CONCLUSION:  No Acute Disease    [id number]    Electronically signed by:  Chris Sue MD  1/23/2023 1:58 PM CST  Workstation: [id number]    CT Head Without Contrast Stroke Protocol [[id number]] Collected: 01/23/23 1311     Updated: 01/23/23 1330    Narrative:      EXAM: CT Brain without contrast    COMPARISON: MRI January 22    HISTORY: Neuro deficit, acute, stroke suspected     TECHNIQUE: Axial images were obtained and multiplanar  reconstructions were made.    This exam was performed using radiation doses that are as low as  reasonably achievable (ALARA) according to our departmental dose  optimization program, which includes automated exposure control,  adjustment of the mA and/or KV according to patient size and/or  use of iterative reconstruction technique.    FINDINGS:    Extra axial space: No visualized hemorrhage    Cerebral parenchyma: No midline shift. Normal gray-white  differentiation    Ventricular system: Normal for age    Deep structures: Symmetric    Basal cisterns:  Normal    Cerebellum: Unremarkable    Brainstem: Unremarkable    Vascular system: Normal    Paranasal sinuses and mastoid air cells: Clear    Calvarium: No visualized fracture     Skull base: Normal    Visualized orbits: Normal    Soft tissues: Normal    Sella: Normal      Impression:      IMPRESSION  No acute intracranial abnormality    Electronically signed by:  Tobi Aguirre DO  1/23/2023 1:28 PM  CST Workstation: [id number]            Assessment:    Active Hospital Problems    Diagnosis    • **Expressive aphasia              Plan:  1.  Significant expressive aphasia: With associated neurologic symptoms.  Improving per patient.  Differential diagnosis includes TIA and concussion.  Could also be metabolic, but blood, urine, and chest x-ray are all completely normal.  Neurology following.  2.  Reported history of CVA: On no medications.  No obvious old stroke on MRI.  3.  DVT prophylaxis: SCDs    Medical Decision Making  Number and Complexity of problems: 1 medical problem of high complexity  Differential Diagnosis: As above    Conditions and Status:        Condition is improving.     [facility] Data  External documents reviewed: PCP notes  My EKG interpretation: Normal sinus rhythm no acute ST or T wave changes  My plain film interpretation: Chest x-ray shows no acute abnormality     Discussed with: Patient and nursing     Treatment Plan  As above    Care Planning  Shared decision making: Patient only moderately able to participate in shared decision making, but she did seem to understand plan of care  Code status and discussions: Full code    Disposition  Social Determinants of Health that impact treatment or disposition: Currently, patient with significant expressive aphasia and left-sided weakness  I expect the patient to be discharged to home versus rehab in 2-3 days.      I confirmed that the patient's Advance Care Plan is present, code status is documented, or surrogate decision maker is listed in the patient's  medical record.          This document has been electronically signed by Pietro Chowdary MD on January 23, 2023 19:24 CST

## 2023-01-24 NOTE — CONSULTS
Nutrition Services    Patient Name:  Sloan Boyd  YOB: 1965  MRN: [id number]  Admit Date:  1/23/2023    Visited pt due to Chewing/swallowing difficulties.  However, pt denies any eating problems at this time.  She was eating well pta.  Reports some wt loss but it was an intentional wt loss she achieved from eating better.  She was admitted related to a fall in which she hit her head and had speech difficulty, and left sided weakness.  All symptoms have resolved.  RD encouraged healthy eating and ways to make healthy food choices.  Nutrition Appropriate for condition at this time.      Electronically signed by:  Theodora Dao RD  01/24/23 14:27 CST

## 2023-01-24 NOTE — PROGRESS NOTES
"Stroke Progress Note       Chief Complaint: No complaints    Subjective     HPI: Pt is a 57-yr-old right-handed -American female with known diagnosis of previous stroke, hypertension, hyperlipidemia, and migranies who presented after a fall in which she hit her head, speech difficulty, and left sided weakness. Today she states \"I feel great.\" Strengths are equal, denies numbness, and visual field is intact.       Review of Systems   HENT: Negative.    Eyes: Negative.    Respiratory: Negative.    Cardiovascular: Negative.    Gastrointestinal: Negative.    Genitourinary: Negative.    Musculoskeletal: Negative.    Skin: Negative.    Neurological: Negative.    Psychiatric/Behavioral: Negative.         Objective      Temp:  [97.3 °F (36.3 °C)-98.2 °F (36.8 °C)] 97.8 °F (36.6 °C)  Heart Rate:  [57-85] 79  Resp:  [14-20] 15  BP: (133-163)/() 133/74    Neurological Exam  Mental Status  Awake, alert and oriented to person, place and time.Alert. Oriented to person, place, and time. Speech is normal. Language is fluent with no aphasia.    Cranial Nerves  CN II: Visual acuity is normal. Visual fields full to confrontation.  CN III, IV, VI: Extraocular movements intact bilaterally. Normal lids and orbits bilaterally. Pupils equal round and reactive to light bilaterally.  CN V: Facial sensation is normal.  CN VII: Full and symmetric facial movement.  CN IX, X: Palate elevates symmetrically. Normal gag reflex.  CN XI: Shoulder shrug strength is normal.  CN XII: Tongue midline without atrophy or fasciculations.    Motor  Normal muscle bulk throughout. No fasciculations present. Strength is 5/5 throughout all four extremities.    Sensory  Sensation is intact to light touch, pinprick, vibration and proprioception in all four extremities.    Reflexes  Not assessed.    Coordination    Finger-to-nose, rapid alternating movements and heel-to-shin normal bilaterally without dysmetria.    Gait    Not assessed.      Physical " Exam  Vitals and nursing note reviewed.   Constitutional:       Appearance: Normal appearance.   HENT:      Head: Normocephalic and atraumatic.   Eyes:      General: Lids are normal.      Extraocular Movements: Extraocular movements intact.      Pupils: Pupils are equal, round, and reactive to light.   Cardiovascular:      Rate and Rhythm: Normal rate.   Pulmonary:      Effort: Pulmonary effort is normal.   Musculoskeletal:         General: Normal range of motion.      Cervical back: Normal range of motion.   Skin:     General: Skin is warm and dry.   Neurological:      Mental Status: She is alert and oriented to person, place, and time. Mental status is at baseline.      Motor: Motor strength is normal.      Coordination: Coordination is intact.   Psychiatric:         Mood and Affect: Mood normal.         Speech: Speech normal.         Results Review:    I reviewed the patient's new clinical results.    Lab Results (last 24 hours)     Procedure Component Value Units Date/Time    Basic Metabolic Panel [[id number]]  (Abnormal) Collected: 01/24/23 0450    Specimen: Blood Updated: 01/24/23 0526     Glucose 110 mg/dL      BUN 9 mg/dL      Creatinine 0.81 mg/dL      Sodium 137 mmol/L      Potassium 3.9 mmol/L      Chloride 104 mmol/L      CO2 28.0 mmol/L      Calcium 9.0 mg/dL      BUN/Creatinine Ratio 11.1     Anion Gap 5.0 mmol/L      eGFR 84.3 mL/min/1.73     Narrative:      GFR Normal >60  Chronic Kidney Disease <60  Kidney Failure <15      Phosphorus [[id number]]  (Normal) Collected: 01/24/23 0450    Specimen: Blood Updated: 01/24/23 0526     Phosphorus 3.6 mg/dL     Iron Profile [[id number]]  (Abnormal) Collected: 01/24/23 0450    Specimen: Blood Updated: 01/24/23 0526     Iron 34 mcg/dL      Iron Saturation 12 %      Transferrin 190 mg/dL      TIBC 283 mcg/dL     CBC & Differential [[id number]]  (Normal) Collected: 01/24/23 0450    Specimen: Blood Updated: 01/24/23 0504    Narrative:      The following orders were  created for panel order CBC & Differential.  Procedure                               Abnormality         Status                     ---------                               -----------         ------                     CBC Auto Differential[[id number]]        Normal              Final result                 Please view results for these tests on the individual orders.    CBC Auto Differential [[id number]]  (Normal) Collected: 01/24/23 0450    Specimen: Blood Updated: 01/24/23 0504     WBC 7.61 10*3/mm3      RBC 4.36 10*6/mm3      Hemoglobin 12.7 g/dL      Hematocrit 38.6 %      MCV 88.5 fL      MCH 29.1 pg      MCHC 32.9 g/dL      RDW 13.4 %      RDW-SD 43.7 fl      MPV 11.1 fL      Platelets 275 10*3/mm3      Neutrophil % 57.8 %      Lymphocyte % 30.5 %      Monocyte % 8.3 %      Eosinophil % 2.4 %      Basophil % 0.7 %      Immature Grans % 0.3 %      Neutrophils, Absolute 4.41 10*3/mm3      Lymphocytes, Absolute 2.32 10*3/mm3      Monocytes, Absolute 0.63 10*3/mm3      Eosinophils, Absolute 0.18 10*3/mm3      Basophils, Absolute 0.05 10*3/mm3      Immature Grans, Absolute 0.02 10*3/mm3      nRBC 0.0 /100 WBC     Vitamin B12 [[id number]] Collected: 01/24/23 0450    Specimen: Blood Updated: 01/24/23 0458    Folate [[id number]] Collected: 01/24/23 0450    Specimen: Blood Updated: 01/24/23 0458    Urine Drug Screen - Urine, Clean Catch [[id number]]  (Normal) Collected: 01/24/23 0049    Specimen: Urine, Clean Catch Updated: 01/24/23 0202     THC, Screen, Urine Negative     Phencyclidine (PCP), Urine Negative     Cocaine Screen, Urine Negative     Methamphetamine, Ur Negative     Opiate Screen Negative     Amphetamine Screen, Urine Negative     Benzodiazepine Screen, Urine Negative     Tricyclic Antidepressants Screen Negative     Methadone Screen, Urine Negative     Barbiturates Screen, Urine Negative     Oxycodone Screen, Urine Negative     Propoxyphene Screen Negative     Buprenorphine, Screen, Urine Negative     Narrative:      Cutoff For Drugs Screened:    Amphetamines               500 ng/ml  Barbiturates               200 ng/ml  Benzodiazepines            150 ng/ml  Cocaine                    150 ng/ml  Methadone                  200 ng/ml  Opiates                    100 ng/ml  Phencyclidine               25 ng/ml  THC                            50 ng/ml  Methamphetamine            500 ng/ml  Tricyclic Antidepressants  300 ng/ml  Oxycodone                  100 ng/ml  Propoxyphene               300 ng/ml  Buprenorphine               10 ng/ml    The normal value for all drugs tested is negative. This report includes unconfirmed screening results, with the cutoff values listed, to be used for medical treatment purposes only.  Unconfirmed results must not be used for non-medical purposes such as employment or legal testing.  Clinical consideration should be applied to any drug of abuse test, particularly when unconfirmed results are used.      Extra Tubes [[id number]] Collected: 01/23/23 1332    Specimen: Blood, Venous Line Updated: 01/23/23 1745    Narrative:      The following orders were created for panel order Extra Tubes.  Procedure                               Abnormality         Status                     ---------                               -----------         ------                     Lavender Top[[id number]]                                     Final result               Gold Top - [facility][[id number]]                                   Final result               Gray Top[[id number]]                                         Final result                 Please view results for these tests on the individual orders.    Gray Top [[id number]] Collected: 01/23/23 1332    Specimen: Blood Updated: 01/23/23 1745     Extra Tube Hold for add-ons.     Comment: Auto resulted.       Urinalysis With Culture If Indicated - Urine, Clean Catch [[id number]]  (Normal) Collected: 01/23/23 1636    Specimen: Urine, Clean Catch Updated: 01/23/23  1644     Color, UA Yellow     Appearance, UA Clear     pH, UA 7.5     Specific Gravity, UA 1.012     Glucose, UA Negative     Ketones, UA Negative     Bilirubin, UA Negative     Blood, UA Negative     Protein, UA Negative     Leuk Esterase, UA Negative     Nitrite, UA Negative     Urobilinogen, UA 0.2 E.U./dL    Narrative:      In absence of clinical symptoms, the presence of pyuria, bacteria, and/or nitrites on the urinalysis result does not correlate with infection.  Urine microscopic not indicated.    [facility] [[id number]] Collected: 01/23/23 1332    Specimen: Blood Updated: 01/23/23 1445     Extra Tube Hold for add-ons.     Comment: Auto resulted.       Lavender Top [[id number]] Collected: 01/23/23 1332    Specimen: Blood Updated: 01/23/23 1445     Extra Tube hold for add-on     Comment: Auto resulted       Comprehensive Metabolic Panel [[id number]] Collected: 01/23/23 1332    Specimen: Blood Updated: 01/23/23 1405     Glucose 95 mg/dL      BUN 10 mg/dL      Creatinine 0.84 mg/dL      Sodium 140 mmol/L      Potassium 3.9 mmol/L      Chloride 106 mmol/L      CO2 28.0 mmol/L      Calcium 9.3 mg/dL      Total Protein 7.9 g/dL      Albumin 4.1 g/dL      ALT (SGPT) 15 U/L      AST (SGOT) 19 U/L      Alkaline Phosphatase 94 U/L      Total Bilirubin 0.4 mg/dL      Globulin 3.8 gm/dL      A/G Ratio 1.1 g/dL      BUN/Creatinine Ratio 11.9     Anion Gap 6.0 mmol/L      eGFR 80.7 mL/min/1.73     Narrative:      GFR Normal >60  Chronic Kidney Disease <60  Kidney Failure <15      CK [[id number]]  (Normal) Collected: 01/23/23 1332    Specimen: Blood Updated: 01/23/23 1405     Creatine Kinase 105 U/L     Magnesium [[id number]]  (Normal) Collected: 01/23/23 1332    Specimen: Blood Updated: 01/23/23 1405     Magnesium 2.3 mg/dL     Troponin [[id number]]  (Normal) Collected: 01/23/23 1332    Specimen: Blood Updated: 01/23/23 1404     Troponin T <0.010 ng/mL     Narrative:      Troponin T Reference Range:  <= 0.03 ng/mL-    Negative for AMI  >0.03 ng/mL-     Abnormal for myocardial necrosis.  Clinicians would have to utilize clinical acumen, EKG, Troponin and serial changes to determine if it is an Acute Myocardial Infarction or myocardial injury due to an underlying chronic condition.       Results may be falsely decreased if patient taking Biotin.      Protime-INR [[id number]]  (Normal) Collected: 01/23/23 1332    Specimen: Blood Updated: 01/23/23 1358     Protime 13.2 Seconds      INR 1.01    Narrative:      Therapeutic range for most indications is 2.0-3.0 INR,  or 2.5-3.5 for mechanical heart valves.    CBC & Differential [[id number]] Collected: 01/23/23 1332    Specimen: Blood Updated: 01/23/23 1351    Narrative:      The following orders were created for panel order CBC & Differential.  Procedure                               Abnormality         Status                     ---------                               -----------         ------                     CBC Auto Differential[[id number]]        Normal              Final result               Scan Slide[[id number]]                                                                    Please view results for these tests on the individual orders.    CBC Auto Differential [[id number]]  (Normal) Collected: 01/23/23 1332    Specimen: Blood Updated: 01/23/23 1350     WBC 9.17 10*3/mm3      RBC 4.73 10*6/mm3      Hemoglobin 13.9 g/dL      Hematocrit 42.4 %      MCV 89.6 fL      MCH 29.4 pg      MCHC 32.8 g/dL      RDW 13.6 %      RDW-SD 44.7 fl      MPV 11.7 fL      Platelets 297 10*3/mm3      Neutrophil % 66.2 %      Lymphocyte % 25.5 %      Monocyte % 5.6 %      Eosinophil % 1.7 %      Basophil % 0.7 %      Immature Grans % 0.3 %      Neutrophils, Absolute 6.07 10*3/mm3      Lymphocytes, Absolute 2.34 10*3/mm3      Monocytes, Absolute 0.51 10*3/mm3      Eosinophils, Absolute 0.16 10*3/mm3      Basophils, Absolute 0.06 10*3/mm3      Immature Grans, Absolute 0.03 10*3/mm3      nRBC 0.0 /100  "WBC         MRI Angiogram Head Without Contrast    Result Date: 1/23/2023  No evidence of hemodynamically significant stenosis of the visualized intracranial arteries. No evidence of aneurysm within the [place]. Electronically signed by:  Doe Fierro MD  1/23/2023 3:47 PM CST Workstation: [id number]    MRI Brain Without Contrast    Result Date: 1/23/2023  Age-appropriate cerebral atrophy Chronic microangiopathic changes. No evidence of acute intracranial process. Electronically signed by:  Doe Fierro MD  1/23/2023 3:53 PM CST Workstation: [id number]    XR Chest 1 View    Result Date: 1/23/2023  CONCLUSION: No Acute Disease [id number] Electronically signed by:  Chris Sue MD  1/23/2023 1:58 PM CST Workstation: [id number]    CT Head Without Contrast Stroke Protocol    Result Date: 1/23/2023  IMPRESSION No acute intracranial abnormality Electronically signed by:  Tobi Aguirre DO  1/23/2023 1:28 PM CST Workstation: [id number]          Assessment/Plan     Assessment/Plan: Pt is a 57-yr-old right-handed -American female with known diagnosis of previous stroke, hypertension, hyperlipidemia, and migranies who presented after a fall in which she hit her head, speech difficulty, and left sided weakness. Today she states \"I feel great.\" Strengths are equal, denies numbness, and visual field is intact.   1. Symptoms resolved, MRI and MRA neg for stroke or stenosis, pt states noticing a headache when her BP is elevated. Encouraged her to check her BP three times/day and keep log to take to PCP. BP has been elevated at times during admission. Recommend starting small dose of BP medication. Instructed to take magnesium 500 mg/day and B2 for migraine prevention. Instructed on stroke risk factors, prevention, and s/s to call 911. Pt and  verbalized understanding.   2. Hyperlipidemia- LDL is 150 and tc is 215, recommend starting Crestor 20 mg/day d/t Lipitor intolerance.   Case was discussed with pt, pt's ,  " Eduarda, Hospitalist team, and nursing. Neurology will sign off.           Patient Active Problem List   Diagnosis   • Expressive aphasia           Manny Mcclure, APRN  01/24/23  10:13 CST        I spent 40 minutes caring for Sloan Boyd  on this date of service. This time includes time spent by me in the following activities: preparing for the visit, reviewing tests, obtaining and/or reviewing a separately obtained history, performing a medically appropriate examination and/or evaluation, counseling and educating the patient/family/caregiver, ordering medications, tests, or procedures, referring and communicating with other health care professionals, documenting information in the medical record, independently interpreting results and communicating that information with the patient/family/caregiver and care coordination

## 2023-01-24 NOTE — DISCHARGE SUMMARY
[facility] Medicine Services  DISCHARGE SUMMARY       Date of Admission: 1/23/2023  Date of Discharge:  1/24/2023  Primary Care Physician: Tess Bermudez MD    Presenting Problem/History of Present Illness:  Expressive aphasia [R47.01]  Nonintractable headache, unspecified chronicity pattern, unspecified headache type [R51.9]       Final Discharge Diagnoses:  Active Hospital Problems    Diagnosis    • **Expressive aphasia        Consults:   Consults     Date and Time Order Name Status Description    1/23/2023  1:43 PM Inpatient Neurology Consult Stroke Completed           Pertinent Test Results:   Lab Results (most recent)     Procedure Component Value Units Date/Time    Vitamin B12 [[id number]]  (Normal) Collected: 01/24/23 0450    Specimen: Blood Updated: 01/24/23 1314     Vitamin B-12 751 pg/mL     Narrative:      Results may be falsely increased if patient taking Biotin.      Folate [[id number]]  (Normal) Collected: 01/24/23 0450    Specimen: Blood Updated: 01/24/23 1314     Folate 13.90 ng/mL     Narrative:      Results may be falsely increased if patient taking Biotin.      Basic Metabolic Panel [[id number]]  (Abnormal) Collected: 01/24/23 0450    Specimen: Blood Updated: 01/24/23 0526     Glucose 110 mg/dL      BUN 9 mg/dL      Creatinine 0.81 mg/dL      Sodium 137 mmol/L      Potassium 3.9 mmol/L      Chloride 104 mmol/L      CO2 28.0 mmol/L      Calcium 9.0 mg/dL      BUN/Creatinine Ratio 11.1     Anion Gap 5.0 mmol/L      eGFR 84.3 mL/min/1.73     Narrative:      GFR Normal >60  Chronic Kidney Disease <60  Kidney Failure <15      Phosphorus [[id number]]  (Normal) Collected: 01/24/23 0450    Specimen: Blood Updated: 01/24/23 0526     Phosphorus 3.6 mg/dL     Iron Profile [[id number]]  (Abnormal) Collected: 01/24/23 0450    Specimen: Blood Updated: 01/24/23 0526     Iron 34 mcg/dL      Iron Saturation 12 %      Transferrin 190 mg/dL      TIBC 283 mcg/dL      CBC & Differential [[id number]]  (Normal) Collected: 01/24/23 0450    Specimen: Blood Updated: 01/24/23 0504    Narrative:      The following orders were created for panel order CBC & Differential.  Procedure                               Abnormality         Status                     ---------                               -----------         ------                     CBC Auto Differential[[id number]]        Normal              Final result                 Please view results for these tests on the individual orders.    CBC Auto Differential [[id number]]  (Normal) Collected: 01/24/23 0450    Specimen: Blood Updated: 01/24/23 0504     WBC 7.61 10*3/mm3      RBC 4.36 10*6/mm3      Hemoglobin 12.7 g/dL      Hematocrit 38.6 %      MCV 88.5 fL      MCH 29.1 pg      MCHC 32.9 g/dL      RDW 13.4 %      RDW-SD 43.7 fl      MPV 11.1 fL      Platelets 275 10*3/mm3      Neutrophil % 57.8 %      Lymphocyte % 30.5 %      Monocyte % 8.3 %      Eosinophil % 2.4 %      Basophil % 0.7 %      Immature Grans % 0.3 %      Neutrophils, Absolute 4.41 10*3/mm3      Lymphocytes, Absolute 2.32 10*3/mm3      Monocytes, Absolute 0.63 10*3/mm3      Eosinophils, Absolute 0.18 10*3/mm3      Basophils, Absolute 0.05 10*3/mm3      Immature Grans, Absolute 0.02 10*3/mm3      nRBC 0.0 /100 WBC     Urine Drug Screen - Urine, Clean Catch [[id number]]  (Normal) Collected: 01/24/23 0049    Specimen: Urine, Clean Catch Updated: 01/24/23 0202     THC, Screen, Urine Negative     Phencyclidine (PCP), Urine Negative     Cocaine Screen, Urine Negative     Methamphetamine, Ur Negative     Opiate Screen Negative     Amphetamine Screen, Urine Negative     Benzodiazepine Screen, Urine Negative     Tricyclic Antidepressants Screen Negative     Methadone Screen, Urine Negative     Barbiturates Screen, Urine Negative     Oxycodone Screen, Urine Negative     Propoxyphene Screen Negative     Buprenorphine, Screen, Urine Negative    Narrative:      Cutoff For Drugs  Screened:    Amphetamines               500 ng/ml  Barbiturates               200 ng/ml  Benzodiazepines            150 ng/ml  Cocaine                    150 ng/ml  Methadone                  200 ng/ml  Opiates                    100 ng/ml  Phencyclidine               25 ng/ml  THC                            50 ng/ml  Methamphetamine            500 ng/ml  Tricyclic Antidepressants  300 ng/ml  Oxycodone                  100 ng/ml  Propoxyphene               300 ng/ml  Buprenorphine               10 ng/ml    The normal value for all drugs tested is negative. This report includes unconfirmed screening results, with the cutoff values listed, to be used for medical treatment purposes only.  Unconfirmed results must not be used for non-medical purposes such as employment or legal testing.  Clinical consideration should be applied to any drug of abuse test, particularly when unconfirmed results are used.      Extra Tubes [[id number]] Collected: 01/23/23 1332    Specimen: Blood, Venous Line Updated: 01/23/23 1745    Narrative:      The following orders were created for panel order Extra Tubes.  Procedure                               Abnormality         Status                     ---------                               -----------         ------                     Lavender Top[[id number]]                                     Final result               Gold Top - [facility][[id number]]                                   Final result               Gray Top[[id number]]                                         Final result                 Please view results for these tests on the individual orders.    Gray Top [[id number]] Collected: 01/23/23 1332    Specimen: Blood Updated: 01/23/23 1745     Extra Tube Hold for add-ons.     Comment: Auto resulted.       Urinalysis With Culture If Indicated - Urine, Clean Catch [[id number]]  (Normal) Collected: 01/23/23 1636    Specimen: Urine, Clean Catch Updated: 01/23/23 1644     Color, UA Yellow      Appearance, UA Clear     pH, UA 7.5     Specific Gravity, UA 1.012     Glucose, UA Negative     Ketones, UA Negative     Bilirubin, UA Negative     Blood, UA Negative     Protein, UA Negative     Leuk Esterase, UA Negative     Nitrite, UA Negative     Urobilinogen, UA 0.2 E.U./dL    Narrative:      In absence of clinical symptoms, the presence of pyuria, bacteria, and/or nitrites on the urinalysis result does not correlate with infection.  Urine microscopic not indicated.    Gold Top - [facility] [[id number]] Collected: 01/23/23 1332    Specimen: Blood Updated: 01/23/23 1445     Extra Tube Hold for add-ons.     Comment: Auto resulted.       Lavender Top [[id number]] Collected: 01/23/23 1332    Specimen: Blood Updated: 01/23/23 1445     Extra Tube hold for add-on     Comment: Auto resulted       Comprehensive Metabolic Panel [[id number]] Collected: 01/23/23 1332    Specimen: Blood Updated: 01/23/23 1405     Glucose 95 mg/dL      BUN 10 mg/dL      Creatinine 0.84 mg/dL      Sodium 140 mmol/L      Potassium 3.9 mmol/L      Chloride 106 mmol/L      CO2 28.0 mmol/L      Calcium 9.3 mg/dL      Total Protein 7.9 g/dL      Albumin 4.1 g/dL      ALT (SGPT) 15 U/L      AST (SGOT) 19 U/L      Alkaline Phosphatase 94 U/L      Total Bilirubin 0.4 mg/dL      Globulin 3.8 gm/dL      A/G Ratio 1.1 g/dL      BUN/Creatinine Ratio 11.9     Anion Gap 6.0 mmol/L      eGFR 80.7 mL/min/1.73     Narrative:      GFR Normal >60  Chronic Kidney Disease <60  Kidney Failure <15      CK [[id number]]  (Normal) Collected: 01/23/23 1332    Specimen: Blood Updated: 01/23/23 1405     Creatine Kinase 105 U/L     Magnesium [[id number]]  (Normal) Collected: 01/23/23 1332    Specimen: Blood Updated: 01/23/23 1405     Magnesium 2.3 mg/dL     Troponin [[id number]]  (Normal) Collected: 01/23/23 1332    Specimen: Blood Updated: 01/23/23 1404     Troponin T <0.010 ng/mL     Narrative:      Troponin T Reference Range:  <= 0.03 ng/mL-   Negative for AMI  >0.03 ng/mL-      Abnormal for myocardial necrosis.  Clinicians would have to utilize clinical acumen, EKG, Troponin and serial changes to determine if it is an Acute Myocardial Infarction or myocardial injury due to an underlying chronic condition.       Results may be falsely decreased if patient taking Biotin.      Protime-INR [[id number]]  (Normal) Collected: 01/23/23 1332    Specimen: Blood Updated: 01/23/23 1358     Protime 13.2 Seconds      INR 1.01    Narrative:      Therapeutic range for most indications is 2.0-3.0 INR,  or 2.5-3.5 for mechanical heart valves.    CBC & Differential [[id number]] Collected: 01/23/23 1332    Specimen: Blood Updated: 01/23/23 1351    Narrative:      The following orders were created for panel order CBC & Differential.  Procedure                               Abnormality         Status                     ---------                               -----------         ------                     CBC Auto Differential[[id number]]        Normal              Final result               Scan Slide[[id number]]                                                                    Please view results for these tests on the individual orders.    CBC Auto Differential [[id number]]  (Normal) Collected: 01/23/23 1332    Specimen: Blood Updated: 01/23/23 1350     WBC 9.17 10*3/mm3      RBC 4.73 10*6/mm3      Hemoglobin 13.9 g/dL      Hematocrit 42.4 %      MCV 89.6 fL      MCH 29.4 pg      MCHC 32.8 g/dL      RDW 13.6 %      RDW-SD 44.7 fl      MPV 11.7 fL      Platelets 297 10*3/mm3      Neutrophil % 66.2 %      Lymphocyte % 25.5 %      Monocyte % 5.6 %      Eosinophil % 1.7 %      Basophil % 0.7 %      Immature Grans % 0.3 %      Neutrophils, Absolute 6.07 10*3/mm3      Lymphocytes, Absolute 2.34 10*3/mm3      Monocytes, Absolute 0.51 10*3/mm3      Eosinophils, Absolute 0.16 10*3/mm3      Basophils, Absolute 0.06 10*3/mm3      Immature Grans, Absolute 0.03 10*3/mm3      nRBC 0.0 /100 WBC         Imaging Results (Most  Recent)     Procedure Component Value Units Date/Time    MRI Brain Without Contrast [[id number]] Collected: 01/23/23 1359     Updated: 01/23/23 1558    Narrative:      EXAM:  MR BRAIN WITHOUT IV CONTRAST    ORDERING PROVIDER:  RORY WHITE    CLINICAL HISTORY:   Stroke    COMPARISON:   1/22/2020    TECHNIQUE:   Axial T1-weighted, T2-weighted and diffusion weighted, FLAIR, and  sagittal T1-weighted images,  T2 gradient echo as well as axial  and coronal T1 weight images with no IV contrast were obtained  using a 1.5 Annita MRI machine.    FINDINGS:    CEREBRAL PARENCHYMA: Scattered T2 and FLAIR hyperintensities.  These are consistent with chronic microangiopathic changes. No  encephalomalacia, mass or gliosis. Mild age dependent cerebral  atrophy.    POSTERIOR FOSSA: No mass or abnormal signal. Unremarkable  craniocervical junction.    EXTRA-AXIAL SPACES: No mass. Unremarkable contour.    PITUITARY: No mass or parasellar abnormality.    ORBITS: No mass. Unremarkable extraocular muscles, globe and  optic nerve.    CALVARIA AND SOFT TISSUES:  No mass, adenopathy, or abnormal  signal.    TEMPORAL BONE AND SKULL BASE: Unremarkable mastoid air cells,  inner and middle ear.    PARANASAL SINUSES: Unremarkable.    VASCULAR STRUCTURES: Normal flow voids are present..    DIFFUSION WEIGHTED IMAGES: No restricted diffusion.    SUSCEPTIBILITY SEQUENCE: No susceptibility artifact.      Impression:      Age-appropriate cerebral atrophy    Chronic microangiopathic changes.    No evidence of acute intracranial process.    Electronically signed by:  Doe Fierro MD  1/23/2023 3:53 PM CST  Workstation: [id number]    MRI Angiogram Head Without Contrast [[id number]] Collected: 01/23/23 1359     Updated: 01/23/23 1550    Narrative:      EXAM:  MR BRAIN ANGIOGRAPHY WITHOUT IV CONTRAST    ORDERING PROVIDER:  RORY WHITE    CLINICAL HISTORY:  Stroke    COMPARISON:       TECHNIQUE:   Time-of-flight MRA of the brain was performed.  Images were  reprojected into multiple planes using MIPS reprojections and  viewed in cine mode.    FINDINGS:     INTERNAL CAROTID ARTERIES: Normal course and caliber. No aneurysm  or significant stenosis.    ANTERIOR COMMUNICATING ARTERY: Patent.    POSTERIOR COMMUNICATING ARTERIES: Patent.    ANTERIOR, MIDDLE AND POSTERIOR CEREBRAL ARTERIES: Normal course  and caliber. No aneurysm or significant stenosis.    VERTEBRAL BASILAR SYSTEM: Distal vertebral arteries are left  dominant and patent. Basilar artery is normal caliber and patent.  Anterior inferior, posterior inferior and superior cerebellar  arteries are unremarkable.  No aneurysm.      Impression:      No evidence of hemodynamically significant stenosis of the  visualized intracranial arteries.  No evidence of aneurysm within the [place].    Electronically signed by:  Deo Fierro MD  1/23/2023 3:47 PM CST  Workstation: [id number]    XR Chest 1 View [[id number]] Collected: 01/23/23 1336     Updated: 01/23/23 1401    Narrative:        PORTABLE CHEST    HISTORY: Chest pain    Portable AP upright film of the chest was obtained at 1:34 PM.  COMPARISON: March 8, 2022    FINDINGS:   EKG leads.  The lungs are clear of an acute process.  The heart is not enlarged.  The pulmonary vasculature is not increased.  No pleural effusion.  No pneumothorax.  No acute osseous abnormality.  Degenerative changes are present in the thoracic spine.  Hypertrophic change acromioclavicular joints.      Impression:      CONCLUSION:  No Acute Disease    [id number]    Electronically signed by:  Chris Sue MD  1/23/2023 1:58 PM CST  Workstation: [id number]    CT Head Without Contrast Stroke Protocol [[id number]] Collected: 01/23/23 1311     Updated: 01/23/23 1330    Narrative:      EXAM: CT Brain without contrast    COMPARISON: MRI January 22    HISTORY: Neuro deficit, acute, stroke suspected     TECHNIQUE: Axial images were obtained and multiplanar  reconstructions were made.    This exam  was performed using radiation doses that are as low as  reasonably achievable (ALARA) according to our departmental dose  optimization program, which includes automated exposure control,  adjustment of the mA and/or KV according to patient size and/or  use of iterative reconstruction technique.    FINDINGS:    Extra axial space: No visualized hemorrhage    Cerebral parenchyma: No midline shift. Normal gray-white  differentiation    Ventricular system: Normal for age    Deep structures: Symmetric    Basal cisterns: Normal    Cerebellum: Unremarkable    Brainstem: Unremarkable    Vascular system: Normal    Paranasal sinuses and mastoid air cells: Clear    Calvarium: No visualized fracture     Skull base: Normal    Visualized orbits: Normal    Soft tissues: Normal    Sella: Normal      Impression:      IMPRESSION  No acute intracranial abnormality    Electronically signed by:  Tobi Aguirre DO  1/23/2023 1:28 PM  CST Workstation: [id number]          Chief Complaint on Day of Discharge: None    Hospital Course:  The patient is a 57 y.o. female who presented to [facility] with significant expressive aphasia and left-sided weakness.  Neuroimaging was negative and neurology consulted.  On hospital day 2 (day of discharge) patient had returned to baseline.  Speech was fluent with no aphasia.  Patient had no resultant left-sided weakness.  Patient states that this is happened to her before, but not as bad as this.  She states that she frequently has headaches that wake her up from sleep.  She states they never started during the day, but often started at night and she will have neurologic type symptoms.  She states that when she was younger she had what she described as quadriplegic migraines.  She stopped using's sugar substitute and these resolved.  She will be discharged home with magnesium and vitamin B2 per neurology.  She will get a prescription for Imitrex.  Blood pressure during her hospital stay  "was borderline elevated.  No prescriptions will be given for this, but patient is encouraged to continue to check her blood pressure twice daily and record in a log book.  She will then take her logbook to her PCPs office for evaluation.    Condition on Discharge: Hemodynamically stable    Physical Exam on Discharge:  /74   Pulse 79   Temp 98.3 °F (36.8 °C) (Oral)   Resp 15   Ht 162.6 cm (64\")   Wt 103 kg (226 lb 6.6 oz)   SpO2 98%   BMI 38.86 kg/m²   Physical Exam  Vitals reviewed.   Constitutional:       Appearance: She is well-developed.   HENT:      Head: Normocephalic and atraumatic.   Eyes:      Pupils: Pupils are equal, round, and reactive to light.   Cardiovascular:      Rate and Rhythm: Normal rate and regular rhythm.      Heart sounds: Normal heart sounds. No murmur heard.    No friction rub. No gallop.   Pulmonary:      Effort: Pulmonary effort is normal. No respiratory distress.      Breath sounds: Normal breath sounds. No wheezing or rales.   Chest:      Chest wall: No tenderness.   Abdominal:      General: Bowel sounds are normal. There is no distension.      Palpations: Abdomen is soft.      Tenderness: There is no abdominal tenderness. There is no guarding.   Musculoskeletal:      Cervical back: Normal range of motion and neck supple.   Skin:     General: Skin is warm and dry.   Psychiatric:         Behavior: Behavior normal.         Thought Content: Thought content normal.           Discharge Disposition:  Home or Self Care    Discharge Medications:     Discharge Medications      New Medications      Instructions Start Date   magnesium oxide 400 MG tablet  Commonly known as: MAG-OX   400 mg, Oral, Daily      Riboflavin 100 MG tablet   100 mg, Oral, Daily      rosuvastatin 20 MG tablet  Commonly known as: Crestor   20 mg, Oral, Daily      SUMAtriptan 50 MG tablet  Commonly known as: Imitrex   Take one tablet at onset of headache. May repeat dose one time in 2 hours if headache not " relieved.             Discharge Diet:   Diet Instructions     Diet: Cardiac      Discharge Diet: Cardiac          Activity at Discharge:   Activity Instructions     Activity as Tolerated      Measure Blood Pressure      Twice daily and take your blood pressure log to your primary care appointment    Work Restrictions      Type of Restriction: Work    May Return to Work: Specific Date    Return To Work Date: 1/30/2023    With / Without Restrictions: Without Restrictions        Follow-up Appointments:   Future Appointments   Date Time Provider Department Center   2/1/2023 10:45 AM Alix Curtis MD [facility] None               This document has been electronically signed by Pietro Chowdary MD on January 24, 2023 16:29 CST      Time: 35 minutes

## 2023-01-24 NOTE — PROGRESS NOTES
THC Physician - Brief Progress Note  PERMANENT  01/23/2023 21:05    [facility] - CCU/SD - [facility] - [place] ([facility])    XAVI RHOADES    Date of Service 01/23/2023 21:05    HPI/Events of Note Critical access hospital Provider Assessment Note      Assessment and Plan:  57-year-old female with past medical history of stroke who presented with expressive aphasia, left-sided weakness, and leftward tongue deviation.  She apparently had a fall due to difficulty ambulating.  Labs were completely normal.    CT head, MRI brain, and MRA were negative for any acute intracranial abnormalities.  UA negative for UTI.  Chest x-ray negative for acute chest pathology.  Patient is seen by tele neuro consult and they suspected metabolic source for her neurological   deficits, but no clear metabolic derangements noted.  Vital signs stable with the exception of mild hypertension.  With more time her deficits seem to be improving, but nurse still reports 3/5 strength in the left upper extremity and 2/5 strength in the   left lower extremity.  She still has some expressive aphasia but her speech is improving.  Patient admitted to the ICU for close neurological monitoring.    1. Left-sided weakness  2. Expressive aphasia  3. headache   - unclear cause of neurological deficits at this time, continue frequent neuro checks, neuro consulted  - tylenol prn for headache  - obtain UDS, although no reported drug use  - obtain phosphorus, iron studies, folate, B12 to rule out nutritional deficiencies causing symptoms  - consider complicated migraine with neurological symptoms and ongoing headache, although headache only occured after patient hit her head earlier today per report  - consider psychosomatic causes of her presentation  - consider TIA, however would not expect persistent deficits as we are seeing      __Y___   Video Assessment performed  __Y___   Most recent labs reviewed  __Y___   Vital Signs  reviewed  __Y___   Best Practices addressed:                 VTE prophylaxis: SCDs                 SUP (when indicated): NA                 Current Glucose: 95                      Please notify bedside physician when present or [facility] if glc > 180 X 2                 Sepsis guidelines: NA                 Lung protective strategy: NA                 Targeted Temperature Management: NA    __Y___     Spoke with bedside RN  __Y___     Orders written      Contact [facility] for any needs if bedside physician is not present.      Interventions Intermediate-Other: Neurological deficits- evaluation/management        Electronically Signed by: Brett Reed) on 01/23/2023 21:17

## 2023-01-24 NOTE — PLAN OF CARE
Goal Outcome Evaluation:    Alert and oriented x4. Delayed and slurred speech. Left sided weakness noted. HA with some improvement per pr report.will continue to monitor.

## 2023-01-25 ENCOUNTER — TRANSITIONAL CARE MANAGEMENT TELEPHONE ENCOUNTER (OUTPATIENT)
Dept: CALL CENTER | Facility: HOSPITAL | Age: 58
End: 2023-01-25
Payer: COMMERCIAL

## 2023-01-25 NOTE — OUTREACH NOTE
Call Center TCM Note    Flowsheet Row Responses   [facility] [place] patient discharged from? [place]   Does the patient have one of the following disease processes/diagnoses(primary or secondary)? Other   TCM attempt successful? No   Unsuccessful attempts Attempt 2          Jess Schilling RN    1/25/2023, 14:23 CST

## 2023-01-25 NOTE — OUTREACH NOTE
Call Center TCM Note    Flowsheet Row Responses   [facility] patient discharged from? [place]   Does the patient have one of the following disease processes/diagnoses(primary or secondary)? Other   TCM attempt successful? No  [no verbal release]   Unsuccessful attempts Attempt 1          Jess Schilling RN    1/25/2023, 13:09 CST

## 2023-01-26 ENCOUNTER — TRANSITIONAL CARE MANAGEMENT TELEPHONE ENCOUNTER (OUTPATIENT)
Dept: CALL CENTER | Facility: HOSPITAL | Age: 58
End: 2023-01-26
Payer: COMMERCIAL

## 2023-01-26 NOTE — OUTREACH NOTE
Call Center TCM Note    Flowsheet Row Responses   [facility] patient discharged from? [place]   Does the patient have one of the following disease processes/diagnoses(primary or secondary)? Other   TCM attempt successful? No  [No verbal release]   Unsuccessful attempts Attempt 3          Akilah Coleman RN    1/26/2023, 08:10 CST

## 2023-01-29 LAB
QT INTERVAL: 394 MS
QTC INTERVAL: 422 MS

## 2023-03-21 ENCOUNTER — OFFICE VISIT (OUTPATIENT)
Dept: FAMILY MEDICINE CLINIC | Facility: CLINIC | Age: 58
End: 2023-03-21
Payer: COMMERCIAL

## 2023-03-21 DIAGNOSIS — U07.1 COVID-19: Primary | ICD-10-CM

## 2023-03-21 RX ORDER — PREDNISONE 50 MG/1
50 TABLET ORAL DAILY
Qty: 5 TABLET | Refills: 0 | Status: SHIPPED | OUTPATIENT
Start: 2023-03-21 | End: 2023-03-26

## 2023-03-21 RX ORDER — AZELASTINE HYDROCHLORIDE, FLUTICASONE PROPIONATE 137; 50 UG/1; UG/1
2 SPRAY, METERED NASAL 2 TIMES DAILY
Qty: 23 G | Refills: 0 | Status: SHIPPED | OUTPATIENT
Start: 2023-03-21

## 2023-03-22 NOTE — PROGRESS NOTES
[facility]  Joo Walden MD    Subjective:     Sloan Boyd is a 57 y.o. female who presents for management of COVID.    Patient had URI symptoms which started Sunday.  She did a home test yesterday and was found positive for Covid19.  She states she has had some respiratory distress and a home O2 sat was 72% at one point.  However she is now back in the 90's and is stable.    The following portions of the patient's history were reviewed and updated as appropriate: allergies, current medications, past family history, past medical history, past social history, past surgical history and problem list.    Past Medical Hx:  Past Medical History:   Diagnosis Date   • Stroke (HCC)        Past Surgical Hx:  Past Surgical History:   Procedure Laterality Date   • ABDOMINAL SURGERY     • APPENDECTOMY     • TUBAL ABDOMINAL LIGATION         Current Meds:    Current Outpatient Medications:   •  magnesium oxide (MAG-OX) 400 MG tablet, Take 1 tablet by mouth Daily., Disp: 30 tablet, Rfl: 0  •  Riboflavin 100 MG tablet, Take 100 mg by mouth Daily., Disp: 30 each, Rfl: 0  •  rosuvastatin (Crestor) 20 MG tablet, Take 1 tablet by mouth Daily., Disp: 90 tablet, Rfl: 0  •  SUMAtriptan (Imitrex) 50 MG tablet, Take one tablet at onset of headache. May repeat dose one time in 2 hours if headache not relieved., Disp: 15 tablet, Rfl: 0  •  Azelastine-Fluticasone (Dymista) 137-50 MCG/ACT suspension, 2 sprays into the nostril(s) as directed by provider 2 (Two) Times a Day., Disp: 23 g, Rfl: 0  •  Nirmatrelvir&Ritonavir 300/100 (PAXLOVID) 20 x 150 MG & 10 x 100MG tablet therapy pack tablet, Take 3 tablets by mouth 2 (Two) Times a Day for 5 days., Disp: 30 tablet, Rfl: 0  •  predniSONE (DELTASONE) 50 MG tablet, Take 1 tablet by mouth Daily for 5 days., Disp: 5 tablet, Rfl: 0    Allergies:  Allergies   Allergen Reactions   • Codeine Unknown - High Severity   • Contrast Dye (Echo Or Unknown Ct/Mr) Unknown - High Severity        Family Hx:  History reviewed. No pertinent family history.     Social History:  Social History     Socioeconomic History   • Marital status:    Tobacco Use   • Smoking status: Never   • Smokeless tobacco: Never   Vaping Use   • Vaping Use: Never used   Substance and Sexual Activity   • Alcohol use: Never   • Drug use: Never       Review of Systems  Review of Systems   Constitutional: Positive for chills, fatigue and fever.   HENT: Positive for congestion and rhinorrhea.    Eyes: Negative for visual disturbance.   Respiratory: Positive for shortness of breath.    Cardiovascular: Negative for chest pain.   Gastrointestinal: Negative for abdominal pain, diarrhea, nausea and vomiting.   Endocrine: Negative for polyuria.   Genitourinary: Negative for difficulty urinating and dyspareunia.   Musculoskeletal: Negative for back pain and myalgias.   Skin: Negative for rash and wound.   Neurological: Negative for weakness, numbness and headaches.   Psychiatric/Behavioral: Negative for agitation, behavioral problems and confusion.       Objective:     There were no vitals taken for this visit.  Physical Exam  HENT:      Nose: Congestion present.   Pulmonary:      Effort: No respiratory distress.   Neurological:      Mental Status: She is alert.   Psychiatric:         Mood and Affect: Mood normal.         Behavior: Behavior normal.          Assessment/Plan:     Diagnoses and all orders for this visit:    1. COVID-19 (Primary)  Patient had a BMI above 35 and stated she had respiratory issues at some point yesterday.  She is high risk and is a good candidate for paxlovid.  I discussed R&B.  I explained emergency FDA approval status and she agreed to take the medication.  Instructed the patient to hold statin while taking paxlovid.  Low risk of pregnancy.  At this time does not require hospitalization.  Will also provide steroids for respiratory sx and dymista for congestion.  She requested a letter to verify  quarantine status.  I told her to have the letter picked up today along with paxlovid from our office today.  She agreed to this.  -     Nirmatrelvir&Ritonavir 300/100 (PAXLOVID) 20 x 150 MG & 10 x 100MG tablet therapy pack tablet; Take 3 tablets by mouth 2 (Two) Times a Day for 5 days.  Dispense: 30 tablet; Refill: 0  -     predniSONE (DELTASONE) 50 MG tablet; Take 1 tablet by mouth Daily for 5 days.  Dispense: 5 tablet; Refill: 0  -     Azelastine-Fluticasone (Dymista) 137-50 MCG/ACT suspension; 2 sprays into the nostril(s) as directed by provider 2 (Two) Times a Day.  Dispense: 23 g; Refill: 0             Follow-up:     As needed or if symptoms worsen    Preventative:  Health Maintenance   Topic Date Due   • MAMMOGRAM  Never done   • COLORECTAL CANCER SCREENING  Never done   • TDAP/TD VACCINES (1 - Tdap) Never done   • ZOSTER VACCINE (1 of 2) Never done   • COVID-19 Vaccine (3 - Booster for Pfizer series) 07/05/2021   • ANNUAL PHYSICAL  Never done   • PAP SMEAR  Never done   • INFLUENZA VACCINE  Never done   • LIPID PANEL  05/10/2023   • HEPATITIS C SCREENING  Completed   • Pneumococcal Vaccine 0-64  Aged Out       Alcohol use:  reports no history of alcohol use.  Nicotine status  reports that she has never smoked. She has never used smokeless tobacco.    RISK SCORE: 2     This was a telephone visit that lasted 30 minutes that the patient consented to.  Patient was contacted at an undisclosed location from our clinic.      This document has been electronically signed by Joo Walden MD on March 21, 2023 22:54 CDT